# Patient Record
Sex: FEMALE | Race: WHITE | NOT HISPANIC OR LATINO | Employment: OTHER | URBAN - METROPOLITAN AREA
[De-identification: names, ages, dates, MRNs, and addresses within clinical notes are randomized per-mention and may not be internally consistent; named-entity substitution may affect disease eponyms.]

---

## 2017-01-17 DIAGNOSIS — F51.01 PRIMARY INSOMNIA: Primary | ICD-10-CM

## 2017-01-17 RX ORDER — ZOLPIDEM TARTRATE 5 MG/1
5 TABLET ORAL
Qty: 30 TABLET | Refills: 1 | Status: SHIPPED | OUTPATIENT
Start: 2017-01-17 | End: 2017-04-10

## 2017-03-27 ENCOUNTER — RADIANT APPOINTMENT (OUTPATIENT)
Dept: MAMMOGRAPHY | Facility: CLINIC | Age: 52
End: 2017-03-27
Attending: OBSTETRICS & GYNECOLOGY
Payer: COMMERCIAL

## 2017-03-27 ENCOUNTER — OFFICE VISIT (OUTPATIENT)
Dept: OBGYN | Facility: CLINIC | Age: 52
End: 2017-03-27
Attending: OBSTETRICS & GYNECOLOGY
Payer: COMMERCIAL

## 2017-03-27 VITALS
WEIGHT: 130 LBS | HEIGHT: 64 IN | BODY MASS INDEX: 22.2 KG/M2 | SYSTOLIC BLOOD PRESSURE: 108 MMHG | DIASTOLIC BLOOD PRESSURE: 66 MMHG

## 2017-03-27 DIAGNOSIS — Z12.31 VISIT FOR SCREENING MAMMOGRAM: ICD-10-CM

## 2017-03-27 DIAGNOSIS — Z87.42 HISTORY OF ABNORMAL CERVICAL PAP SMEAR: Primary | ICD-10-CM

## 2017-03-27 PROCEDURE — 88175 CYTOPATH C/V AUTO FLUID REDO: CPT | Performed by: OBSTETRICS & GYNECOLOGY

## 2017-03-27 PROCEDURE — 99212 OFFICE O/P EST SF 10 MIN: CPT | Performed by: OBSTETRICS & GYNECOLOGY

## 2017-03-27 PROCEDURE — G0202 SCR MAMMO BI INCL CAD: HCPCS | Mod: TC

## 2017-03-27 PROCEDURE — 88141 CYTOPATH C/V INTERPRET: CPT | Performed by: OBSTETRICS & GYNECOLOGY

## 2017-03-27 PROCEDURE — 87624 HPV HI-RISK TYP POOLED RSLT: CPT | Performed by: OBSTETRICS & GYNECOLOGY

## 2017-03-27 NOTE — MR AVS SNAPSHOT
"              After Visit Summary   3/27/2017    Katherine Tipton    MRN: [id number]           Patient Information     Date Of Birth          1965        Visit Information        Provider Department      3/27/2017 3:45 PM Cayetano Sal MD [facility]        Today's Diagnoses     History of abnormal cervical Pap smear    -  1       Follow-ups after your visit        Who to contact     If you have questions or need follow up information about today's clinic visit or your schedule please contact [facility] directly at [phone].  Normal or non-critical lab and imaging results will be communicated to you by [employer]art, letter or phone within 4 business days after the clinic has received the results. If you do not hear from us within 7 days, please contact the clinic through Aquirist or phone. If you have a critical or abnormal lab result, we will notify you by phone as soon as possible.  Submit refill requests through Niko Niko or call your pharmacy and they will forward the refill request to us. Please allow 3 business days for your refill to be completed.          Additional Information About Your Visit        MyChart Information     Niko Niko gives you secure access to your electronic health record. If you see a primary care provider, you can also send messages to your care team and make appointments. If you have questions, please call your primary care clinic.  If you do not have a primary care provider, please call [phone] and they will assist you.        Care EveryWhere ID     This is your Care EveryWhere ID. This could be used by other organizations to access your [facility] medical records  [id number]        Your Vitals Were     Height BMI (Body Mass Index)                5' 4\" (1.626 m) 22.31 kg/m2           Blood Pressure from Last 3 Encounters:   03/27/17 108/66   11/30/16 118/62   11/10/16 120/78    Weight from Last 3 Encounters:   03/27/17 130 lb (59 kg) "   11/30/16 129 lb (58.5 kg)   11/10/16 130 lb 9.6 oz (59.2 kg)              We Performed the Following     HPV High Risk Types DNA Cervical     Pap imaged thin layer screen with HPV - recommended age 30 - 65          Today's Medication Changes          These changes are accurate as of: 3/27/17  4:09 PM.  If you have any questions, ask your nurse or doctor.               Stop taking these medicines if you haven't already. Please contact your care team if you have questions.     amoxicillin-clavulanate 1000-62.5 MG per 12 hr tablet   Commonly known as:  AUGMENTIN XR   Stopped by:  Cayetano Sal MD                    Primary Care Provider Office Phone # Fax #    Dani Hwang [phone]       [address]  [place] [facility] [address]        Thank you!     Thank you for choosing [facility] [place]  for your care. Our goal is always to provide you with excellent care. Hearing back from our patients is one way we can continue to improve our services. Please take a few minutes to complete the written survey that you may receive in the mail after your visit with us. Thank you!             Your Updated Medication List - Protect others around you: Learn how to safely use, store and throw away your medicines at www.disposemymeds.org.          This list is accurate as of: 3/27/17  4:09 PM.  Always use your most recent med list.                   Brand Name Dispense Instructions for use    aspirin 81 MG tablet     90 tablet    Take 1 tablet by mouth daily.       estradiol 1 MG tablet    ESTRACE    90 tablet    Take 1 tablet (1 mg) by mouth daily       omeprazole 20 MG CR capsule    priLOSEC    30 capsule    Take 1 capsule by mouth daily.       simvastatin 40 MG tablet    ZOCOR    90 tablet    Take 1 tablet (40 mg) by mouth At Bedtime       VITAMIN B12 PO          VITAMIN D3 PO      Take by mouth daily       zolpidem 5 MG tablet    AMBIEN    30 tablet    Take 1 tablet (5 mg)  by mouth nightly as needed for sleep

## 2017-03-27 NOTE — PROGRESS NOTES
SUBJECTIVE:                                                   Katherine Tipton is a 51 year old female who presents to clinic today for the following health issue(s):  Patient presents with:  Repeat Pap Smear: LSIL and positive HR HPV on 16.      Additional information:     HPI: Patient is seen at this time for follow-up abnormal Pap smears. She also needs her mammogram today.Constitutional:  Appearance: Well nourished, well developed alert, in no acute distress  Pelvic Exam:No LMP recorded. Patient has had a hysterectomy..   Patient is sexually active, .  Using hysterectomy for contraception.    reports that she has quit smoking. Her smoking use included Cigarettes. She has a 1.50 pack-year smoking history. She has never used smokeless tobacco.    STD testing offered?  Declined    Health maintenance updated:  yes    Today's PHQ-2 Score:   PHQ-2 (  [employer]) 2015   Q1: Little interest or pleasure in doing things 0   Q2: Feeling down, depressed or hopeless 0   PHQ-2 Score 0     Today's PHQ-9 Score:   PHQ-9 SCORE 11/10/2016   Total Score 5     Today's PHILOMENA-7 Score:   PHILOMENA-7 SCORE 11/10/2016   Total Score 0       Problem list and histories reviewed & adjusted, as indicated.  Additional history: as documented.    Patient Active Problem List   Diagnosis     CARDIOVASCULAR SCREENING; LDL GOAL LESS THAN 130     Migraine headache     GERD (gastroesophageal reflux disease)     Phlebitis     MEDIAL MENISCUS TEAR - left     Colitis     Familial hyperlipoproteinemia     Paresthesias, hands & feet     Shoulder impingement - bilateral     Cervical radiculopathy     Vertiginous migraine, acephalgic migraine episodes also     Past Surgical History:   Procedure Laterality Date     AS ABLATION, ENDOMETRIAL, THERMAL, W/O HYSTEROSCOPIC GUIDANCE      Her Option Endo Ablation     CL AFF SURGICAL PATHOLOGY       COLONOSCOPY WITH CO2 INSUFFLATION N/A 2016    Procedure: COLONOSCOPY WITH CO2 INSUFFLATION;   Surgeon: Ana Romo MD;  Location: [facility]     COMBINED ESOPHAGOSCOPY, GASTROSCOPY, DUODENOSCOPY (EGD) WITH CO2 INSUFFLATION N/A 7/26/2016    Procedure: COMBINED ESOPHAGOSCOPY, GASTROSCOPY, DUODENOSCOPY (EGD) WITH CO2 INSUFFLATION;  Surgeon: Ana Romo MD;  Location: [facility] OR     ENDOSCOPIC LIGATION VEIN(S)  1995    Right     ESOPHAGOSCOPY, GASTROSCOPY, DUODENOSCOPY (EGD), COMBINED N/A 7/26/2016    Procedure: COMBINED ESOPHAGOSCOPY, GASTROSCOPY, DUODENOSCOPY (EGD), BIOPSY SINGLE OR MULTIPLE;  Surgeon: Ana Romo MD;  Location: [facility] OR     HC DILATION/CURETTAGE DIAG/THER NON OB  2002     HC KNEE SCOPE,SINGLE MENISECTOMY  7/15/11    Left - LATERAL AND MEDIAL MENISECTOMIES - NOT SINGLE!!!     HC REMOVAL OF TONSILS,12+ Y/O  30 years ago     HYSTERECTOMY, SUPRACERVICAL VAGINAL  2006     NECK SURGERY  2014    cervical fusion     TUBAL LIGATION  1993      Social History   Substance Use Topics     Smoking status: Former Smoker     Packs/day: 0.50     Years: 3.00     Types: Cigarettes     Smokeless tobacco: Never Used     Alcohol use 1.2 - 1.8 oz/week     2 - 3 Standard drinks or equivalent per week      Comment: 2-4 drinks/week      Problem (# of Occurrences) Relation (Name,Age of Onset)    Alcohol/Drug (1) Maternal Grandmother    Allergies (1) Daughter    Arthritis (2) Mother, Father    Asthma (2) Son (Santana), Daughter    CANCER (1) Maternal Grandmother: Bone cancer    CEREBROVASCULAR DISEASE (2) Father, Paternal Grandfather    GASTROINTESTINAL DISEASE (1) Mother: Kidney    HEART DISEASE (2) Paternal Grandmother, Son (Santana)    Hypertension (1) Mother    Lipids (1) Father    Neurologic Disorder (3) Paternal Grandmother: migraines, Daughter: migraines, Son (Skyler): migraines       Negative family history of: Blood Disease, Anesthesia Reaction            Current Outpatient Prescriptions   Medication Sig     zolpidem (AMBIEN) 5 MG tablet Take 1 tablet (5 mg) by mouth nightly  "as needed for sleep     Cyanocobalamin (VITAMIN B12 PO)      Cholecalciferol (VITAMIN D3 PO) Take by mouth daily     estradiol (ESTRACE) 1 MG tablet Take 1 tablet (1 mg) by mouth daily     simvastatin (ZOCOR) 40 MG tablet Take 1 tablet (40 mg) by mouth At Bedtime     omeprazole (PRILOSEC) 20 MG capsule Take 1 capsule by mouth daily.     aspirin 81 MG tablet Take 1 tablet by mouth daily.     No current facility-administered medications for this visit.      Allergies   Allergen Reactions     Verapamil Unknown       ROS:  12 point review of systems negative other than symptoms noted below.    OBJECTIVE:     /66  Ht 5' 4\" (1.626 m)  Wt 130 lb (59 kg)  BMI 22.31 kg/m2  Body mass index is 22.31 kg/(m^2).    Exam:  Constitutional:  Appearance: Well nourished, well developed alert, in no acute distress  Pelvic Exam:  External Genitalia:     Normal appearance for age, no discharge present, no tenderness present, no inflammatory lesions present, color normal  Vagina:     Normal vaginal vault without central or paravaginal defects, no discharge present, no inflammatory lesions present, no masses present  Bladder:     Nontender to palpation  Urethra:   Urethral Body:  Urethra palpation normal, urethra structural support normal   Urethral Meatus:  No erythema or lesions present  Cervix:     Surgically absent  Uterus:     Surgically absent  Adnexa:     Surgically absent  Perineum:     Perineum within normal limits, no evidence of trauma, no rashes or skin lesions present  Anus:     Anus within normal limits, no hemorrhoids present  Inguinal Lymph Nodes:     No lymphadenopathy present     In-Clinic Test Results:      ASSESSMENT/PLAN:                                                        We will contact patient with her Pap smear results. She also is completing her mammogram today.          Cayetano Sal MD  [facility] [place]  "

## 2017-03-31 LAB
COPATH REPORT: ABNORMAL
PAP: ABNORMAL

## 2017-04-03 ENCOUNTER — TELEPHONE (OUTPATIENT)
Dept: NURSING | Facility: CLINIC | Age: 52
End: 2017-04-03

## 2017-04-03 LAB
FINAL DIAGNOSIS: ABNORMAL
HPV HR 12 DNA CVX QL NAA+PROBE: POSITIVE
HPV16 DNA SPEC QL NAA+PROBE: NEGATIVE
HPV18 DNA SPEC QL NAA+PROBE: NEGATIVE
SPECIMEN DESCRIPTION: ABNORMAL

## 2017-04-03 NOTE — TELEPHONE ENCOUNTER
Pt is requesting PAP results today if able, no result note available. Note routed to Maria Esther Canada to review and advise requesting a callback today.

## 2017-04-04 DIAGNOSIS — B37.9 YEAST INFECTION: Primary | ICD-10-CM

## 2017-04-04 RX ORDER — FLUCONAZOLE 150 MG/1
150 TABLET ORAL
Qty: 2 TABLET | Refills: 0 | Status: SHIPPED | OUTPATIENT
Start: 2017-04-04 | End: 2017-12-28

## 2017-04-10 DIAGNOSIS — F51.01 PRIMARY INSOMNIA: ICD-10-CM

## 2017-04-10 RX ORDER — ZOLPIDEM TARTRATE 5 MG/1
5 TABLET ORAL
Qty: 30 TABLET | Refills: 1 | Status: SHIPPED | OUTPATIENT
Start: 2017-04-10 | End: 2017-07-13

## 2017-04-10 NOTE — TELEPHONE ENCOUNTER
zolpidem (AMBIEN) 5 MG tablet     Last Written Prescription Date:  1/17/17  Last Fill Quantity: 30,   # refills: 1  Last Office Visit with [facility] primary care provider:  11/10/16  Future Office visit: none    Routing refill request to provider for review/approval because:  Drug not on the [facility], [facility] or [facility] refill protocol or controlled substance. Routing to Maria Esther Canada

## 2017-04-13 NOTE — TELEPHONE ENCOUNTER
Pt calling stating the pharmacy had not received Rx.  Called pharmacy, informed it was faxed twice, gave verbal read back correct.

## 2017-07-13 DIAGNOSIS — F51.01 PRIMARY INSOMNIA: ICD-10-CM

## 2017-07-13 RX ORDER — ZOLPIDEM TARTRATE 5 MG/1
5 TABLET ORAL
Qty: 30 TABLET | Refills: 1 | Status: SHIPPED | OUTPATIENT
Start: 2017-07-13 | End: 2017-09-19

## 2017-07-13 NOTE — TELEPHONE ENCOUNTER
Zolpidem Tartrate 5 mg tablet    Last Written Prescription Date:  4/10/17  Last Fill Quantity: 30,   # refills: 1  Last Office Visit with [facility] primary care provider:  Annual 11/10/16  Future Office visit: none    Routing refill request to provider for review/approval because:  Drug not on the [facility], [facility] or [facility] refill protocol or controlled substance. Routing to Lauren Bro. Pharmacy pended

## 2017-09-13 ENCOUNTER — RESULT FOLLOW UP (OUTPATIENT)
Dept: OBGYN | Facility: CLINIC | Age: 52
End: 2017-09-13

## 2017-09-13 ENCOUNTER — OFFICE VISIT (OUTPATIENT)
Dept: OBGYN | Facility: CLINIC | Age: 52
End: 2017-09-13
Payer: COMMERCIAL

## 2017-09-13 VITALS
HEIGHT: 64 IN | SYSTOLIC BLOOD PRESSURE: 108 MMHG | BODY MASS INDEX: 22.36 KG/M2 | WEIGHT: 131 LBS | DIASTOLIC BLOOD PRESSURE: 68 MMHG

## 2017-09-13 DIAGNOSIS — R87.810 ASCUS WITH POSITIVE HIGH RISK HPV CERVICAL: Primary | ICD-10-CM

## 2017-09-13 DIAGNOSIS — R87.610 ASCUS WITH POSITIVE HIGH RISK HPV CERVICAL: Primary | ICD-10-CM

## 2017-09-13 DIAGNOSIS — Z90.710 HISTORY OF HYSTERECTOMY INCLUDING CERVIX: ICD-10-CM

## 2017-09-13 PROCEDURE — 87624 HPV HI-RISK TYP POOLED RSLT: CPT | Performed by: NURSE PRACTITIONER

## 2017-09-13 PROCEDURE — 88141 CYTOPATH C/V INTERPRET: CPT | Performed by: NURSE PRACTITIONER

## 2017-09-13 PROCEDURE — 88175 CYTOPATH C/V AUTO FLUID REDO: CPT | Performed by: NURSE PRACTITIONER

## 2017-09-13 PROCEDURE — 99212 OFFICE O/P EST SF 10 MIN: CPT | Performed by: NURSE PRACTITIONER

## 2017-09-13 NOTE — PROGRESS NOTES
SUBJECTIVE:                                                   Katherine Tipton is a 52 year old female who presents to clinic today for the following health issue(s):  Patient presents with:  Repeat Pap Smear: ASCUS HPV other (+)pos 3/27/17      HPI:  Pt here today for repeat pap. Her last pap at her annual exam in 2017 was ASCUS HPV other +. She is feeling well. No concerns.    No LMP recorded. Patient has had a hysterectomy..   Patient is sexually active, .  Using hysterectomy for contraception.    reports that she has quit smoking. Her smoking use included Cigarettes. She has a 1.50 pack-year smoking history. She has never used smokeless tobacco.      STD testing offered?  Declined    Health maintenance updated:  yes    Today's PHQ-2 Score:   PHQ-2 (  [employer]) 2015   Q1: Little interest or pleasure in doing things 0   Q2: Feeling down, depressed or hopeless 0   PHQ-2 Score 0     Today's PHQ-9 Score:   PHQ-9 SCORE 11/10/2016   Total Score 5     Today's PHILOMENA-7 Score:   PHILOMENA-7 SCORE 11/10/2016   Total Score 0       Problem list and histories reviewed & adjusted, as indicated.  Additional history: as documented.    Patient Active Problem List   Diagnosis     CARDIOVASCULAR SCREENING; LDL GOAL LESS THAN 130     Migraine headache     GERD (gastroesophageal reflux disease)     Phlebitis     MEDIAL MENISCUS TEAR - left     Colitis     Familial hyperlipoproteinemia     Paresthesias, hands & feet     Shoulder impingement - bilateral     Cervical radiculopathy     Vertiginous migraine, acephalgic migraine episodes also     Past Surgical History:   Procedure Laterality Date     AS ABLATION, ENDOMETRIAL, THERMAL, W/O HYSTEROSCOPIC GUIDANCE      Her Option Endo Ablation     CL AFF SURGICAL PATHOLOGY       COLONOSCOPY WITH CO2 INSUFFLATION N/A 2016    Procedure: COLONOSCOPY WITH CO2 INSUFFLATION;  Surgeon: Ana Romo MD;  Location: [facility]     COMBINED ESOPHAGOSCOPY,  GASTROSCOPY, DUODENOSCOPY (EGD) WITH CO2 INSUFFLATION N/A 7/26/2016    Procedure: COMBINED ESOPHAGOSCOPY, GASTROSCOPY, DUODENOSCOPY (EGD) WITH CO2 INSUFFLATION;  Surgeon: Ana Romo MD;  Location: [facility]     ENDOSCOPIC LIGATION VEIN(S)  1995    Right     ESOPHAGOSCOPY, GASTROSCOPY, DUODENOSCOPY (EGD), COMBINED N/A 7/26/2016    Procedure: COMBINED ESOPHAGOSCOPY, GASTROSCOPY, DUODENOSCOPY (EGD), BIOPSY SINGLE OR MULTIPLE;  Surgeon: Ana Romo MD;  Location: [facility]     HC DILATION/CURETTAGE DIAG/THER NON OB  2002     HC KNEE SCOPE,SINGLE MENISECTOMY  7/15/11    Left - LATERAL AND MEDIAL MENISECTOMIES - NOT SINGLE!!!     HC REMOVAL OF TONSILS,12+ Y/O  30 years ago     HYSTERECTOMY, SUPRACERVICAL VAGINAL  2006     NECK SURGERY  2014    cervical fusion     TUBAL LIGATION  1993      Social History   Substance Use Topics     Smoking status: Former Smoker     Packs/day: 0.50     Years: 3.00     Types: Cigarettes     Smokeless tobacco: Never Used     Alcohol use 1.2 - 1.8 oz/week     2 - 3 Standard drinks or equivalent per week      Comment: 2-4 drinks/week      Problem (# of Occurrences) Relation (Name,Age of Onset)    Alcohol/Drug (1) Maternal Grandmother    Allergies (1) Daughter    Arthritis (2) Mother, Father    Asthma (2) Son (Santana), Daughter    CANCER (1) Maternal Grandmother: Bone cancer    CEREBROVASCULAR DISEASE (2) Father, Paternal Grandfather    GASTROINTESTINAL DISEASE (1) Mother: Kidney    HEART DISEASE (2) Paternal Grandmother, Son (Santana)    Hypertension (1) Mother    Lipids (1) Father    Neurologic Disorder (3) Paternal Grandmother: migraines, Daughter: migraines, Son (Skyler): migraines       Negative family history of: Blood Disease, Anesthesia Reaction            Current Outpatient Prescriptions   Medication Sig     zolpidem (AMBIEN) 5 MG tablet Take 1 tablet (5 mg) by mouth nightly as needed for sleep     fluconazole (DIFLUCAN) 150 MG tablet Take 1 tablet (150 mg) by  "mouth every 3 days     Cyanocobalamin (VITAMIN B12 PO)      Cholecalciferol (VITAMIN D3 PO) Take by mouth daily     estradiol (ESTRACE) 1 MG tablet Take 1 tablet (1 mg) by mouth daily     simvastatin (ZOCOR) 40 MG tablet Take 1 tablet (40 mg) by mouth At Bedtime     omeprazole (PRILOSEC) 20 MG capsule Take 1 capsule by mouth daily.     aspirin 81 MG tablet Take 1 tablet by mouth daily.     No current facility-administered medications for this visit.      Allergies   Allergen Reactions     Verapamil Unknown       ROS:  12 point review of systems negative other than symptoms noted below.    OBJECTIVE:     /68  Ht 5' 4\" (1.626 m)  Wt 131 lb (59.4 kg)  Breastfeeding? No  BMI 22.49 kg/m2  Body mass index is 22.49 kg/(m^2).    Exam:  Constitutional:  Appearance: Well nourished, well developed alert, in no acute distress  Neurologic/Psychiatric:  Mental Status:  Oriented X3   Pelvic Exam:  External Genitalia:     Normal appearance for age, no discharge present, no tenderness present, no inflammatory lesions present, color normal  Vagina:     Normal vaginal vault without central or paravaginal defects, no discharge present, no inflammatory lesions present, no masses present  Bladder:     Nontender to palpation  Urethra:   Urethral Body:  Urethra palpation normal, urethra structural support normal   Urethral Meatus:  No erythema or lesions present  Cervix:     Surgically absent  Uterus:     Surgically absent  Adnexa:     Surgically absent  Perineum:     Perineum within normal limits, no evidence of trauma, no rashes or skin lesions present  Anus:     Anus within normal limits, no hemorrhoids present  Inguinal Lymph Nodes:     No lymphadenopathy present     In-Clinic Test Results:  No results found for this or any previous visit (from the past 24 hour(s)).    ASSESSMENT/PLAN:                                                        ICD-10-CM    1. ASCUS with positive high risk HPV cervical R87.610 Pap imaged thin " layer diagnostic with HPV (select HPV order below)    R87.810 HPV High Risk Types DNA Cervical       There are no Patient Instructions on file for this visit.    Normal vaginal exam. Will update on pap results. If normal or unchanged, repap in 6 months at annual exam. If abnormal, will follow up as appropriate.    MIGUEL Macias [facility]

## 2017-09-13 NOTE — LETTER
Fanny 15, 2018      Katherine LALITA Danuta  [address]  [facility] [address]    Dear ,      At [facility], your health and wellness is our primary concern. That is why we are following up on an abnormal pap from 12/28/17, which was reported as ASCUS and positive for high risk HPV. Your provider had recommended that you have a Pap smear completed by 06/28/18. Our records do not show that this has been scheduled.    It is important to complete the follow up that your provider has suggested for you to ensure that there are no worsening changes which may, over time, develop into cancer.      Please contact our office at  [phone] to schedule an appointment for a Pap smear at your earliest convenience. If you have questions or concerns, please call the clinic and we will be happy to assist you.    If you have completed the tests outside of [facility], please have the results forwarded to our office. We will update the chart for your primary Physician to review before your next annual physical.     Thank you for choosing [facility]!    Sincerely,      Lauren Bro, APRN CNP/es

## 2017-09-13 NOTE — LETTER
December 14, 2018      Katherine Tipton  [address]  [place] [facility] [address]    Dear ,      This letter is to remind you that you are due for your follow up PAP smear and HPV test on or about 12/28/18.    Please call [phone] to schedule your appointment at your earliest convenience.     If you have completed the tests outside of [facility], please have the results forwarded to our office. We will update the chart for your primary Physician to review before your next annual physical.     Sincerely,      MIGUEL Macias CNP/esh

## 2017-09-13 NOTE — MR AVS SNAPSHOT
"              After Visit Summary   9/13/2017    Katherine Tipton    MRN: [id number]           Patient Information     Date Of Birth          1965        Visit Information        Provider Department      9/13/2017 1:00 PM Lauren Bro APRN CNP [facility]        Today's Diagnoses     ASCUS with positive high risk HPV cervical    -  1       Follow-ups after your visit        Follow-up notes from your care team     Return in about 6 months (around 3/13/2018).      Who to contact     If you have questions or need follow up information about today's clinic visit or your schedule please contact [facility] directly at [phone].  Normal or non-critical lab and imaging results will be communicated to you by Purpluhart, letter or phone within 4 business days after the clinic has received the results. If you do not hear from us within 7 days, please contact the clinic through Purpluhart or phone. If you have a critical or abnormal lab result, we will notify you by phone as soon as possible.  Submit refill requests through Red e App or call your pharmacy and they will forward the refill request to us. Please allow 3 business days for your refill to be completed.          Additional Information About Your Visit        MyChart Information     Red e App gives you secure access to your electronic health record. If you see a primary care provider, you can also send messages to your care team and make appointments. If you have questions, please call your primary care clinic.  If you do not have a primary care provider, please call [phone] and they will assist you.        Care EveryWhere ID     This is your Care EveryWhere ID. This could be used by other organizations to access your [facility] medical records  [id number]        Your Vitals Were     Height Breastfeeding? BMI (Body Mass Index)             5' 4\" (1.626 m) No 22.49 kg/m2          Blood Pressure from Last 3 Encounters: "   09/13/17 108/68   03/27/17 108/66   11/30/16 118/62    Weight from Last 3 Encounters:   09/13/17 131 lb (59.4 kg)   03/27/17 130 lb (59 kg)   11/30/16 129 lb (58.5 kg)              We Performed the Following     HPV High Risk Types DNA Cervical     Pap imaged thin layer diagnostic with HPV (select HPV order below)        Primary Care Provider Office Phone # Fax #    Dani Hwang [phone]       [address] [place]  [facility] [address]        Equal Access to Services     [facility]: Hadii joselito ku hadasho Soomaali, waaxda luqadaha, qaybta kaalmada jewel, alfredo duron . So [facility] [phone].    ATENCIÓN: Si habla español, tiene a lundberg disposición servicios gratuitos de asistencia lingüística. [facility] [phone].    We comply with applicable federal civil rights laws and [place] laws. We do not discriminate on the basis of race, color, national origin, age, disability sex, sexual orientation or gender identity.            Thank you!     Thank you for choosing [facility] [place]  for your care. Our goal is always to provide you with excellent care. Hearing back from our patients is one way we can continue to improve our services. Please take a few minutes to complete the written survey that you may receive in the mail after your visit with us. Thank you!             Your Updated Medication List - Protect others around you: Learn how to safely use, store and throw away your medicines at www.disposemymeds.org.          This list is accurate as of: 9/13/17  1:41 PM.  Always use your most recent med list.                   Brand Name Dispense Instructions for use Diagnosis    aspirin 81 MG tablet     90 tablet    Take 1 tablet by mouth daily.    Vertiginous migraine       estradiol 1 MG tablet    ESTRACE    90 tablet    Take 1 tablet (1 mg) by mouth daily    Symptomatic menopausal or female climacteric states       fluconazole 150  MG tablet    DIFLUCAN    2 tablet    Take 1 tablet (150 mg) by mouth every 3 days    Yeast infection       omeprazole 20 MG CR capsule    priLOSEC    30 capsule    Take 1 capsule by mouth daily.    GERD (gastroesophageal reflux disease)       simvastatin 40 MG tablet    ZOCOR    90 tablet    Take 1 tablet (40 mg) by mouth At Bedtime    Hyperlipidemia LDL goal <100       VITAMIN B12 PO           VITAMIN D3 PO      Take by mouth daily        zolpidem 5 MG tablet    AMBIEN    30 tablet    Take 1 tablet (5 mg) by mouth nightly as needed for sleep    Primary insomnia

## 2017-09-13 NOTE — LETTER
October 14, 2019      Katherine Tipton  [address]  [facility] [address]    Dear ,      At [facility], your health and wellness is our primary concern. That is why we are following up on a colposcopy from 04/29/19. Your provider had recommended that you have a Pap smear and HPV test completed by 10/29/19. Our records do not show that this has been scheduled.    It is important to complete the follow up that your provider has suggested for you to ensure that there are no worsening changes which may, over time, develop into cancer.      Please contact our office at  [phone] to schedule an appointment for a Pap smear and HPV test at your earliest convenience. If you have questions or concerns, please call the clinic and we will be happy to assist you.    If you have completed the tests outside of [facility], please have the results forwarded to our office. We will update the chart for your primary Physician to review before your next annual physical.     Thank you for choosing [facility]!    Sincerely,      Your [facility] Care Team//[facility]

## 2017-09-18 LAB
COPATH REPORT: ABNORMAL
PAP: ABNORMAL

## 2017-09-19 DIAGNOSIS — F51.01 PRIMARY INSOMNIA: ICD-10-CM

## 2017-09-19 RX ORDER — ZOLPIDEM TARTRATE 5 MG/1
5 TABLET ORAL
Qty: 30 TABLET | Refills: 1 | Status: SHIPPED | OUTPATIENT
Start: 2017-09-19 | End: 2017-11-21

## 2017-09-19 NOTE — TELEPHONE ENCOUNTER
Pending Prescriptions:                       Disp   Refills    zolpidem (AMBIEN) 5 MG tablet             30 tab*1            Sig: Take 1 tablet (5 mg) by mouth nightly as needed           for sleep          Last Written Prescription Date:  7/13/17  Last Fill Quantity: 30,   # refills: 1  Last Office Visit with [facility], [facility] or [facility] prescribing provider: 11/10/16 (yearly) 9/13/17 (rpt pap)  Future Office visit:   none

## 2017-09-19 NOTE — TELEPHONE ENCOUNTER
Routing refill request to provider for review/approval because:  Drug not on the [facility], [facility] or [facility] refill protocol or controlled substance. Note routed to Lauren narayan for refill?

## 2017-10-09 ENCOUNTER — OFFICE VISIT (OUTPATIENT)
Dept: OBGYN | Facility: CLINIC | Age: 52
End: 2017-10-09
Payer: COMMERCIAL

## 2017-10-09 VITALS
DIASTOLIC BLOOD PRESSURE: 76 MMHG | HEIGHT: 64 IN | BODY MASS INDEX: 22.36 KG/M2 | SYSTOLIC BLOOD PRESSURE: 118 MMHG | WEIGHT: 131 LBS

## 2017-10-09 DIAGNOSIS — N89.3 DYSPLASIA OF VAGINA: Primary | ICD-10-CM

## 2017-10-09 PROCEDURE — 57420 EXAM OF VAGINA W/SCOPE: CPT | Performed by: OBSTETRICS & GYNECOLOGY

## 2017-10-09 PROCEDURE — 87624 HPV HI-RISK TYP POOLED RSLT: CPT | Performed by: OBSTETRICS & GYNECOLOGY

## 2017-10-09 PROCEDURE — 88141 CYTOPATH C/V INTERPRET: CPT | Performed by: OBSTETRICS & GYNECOLOGY

## 2017-10-09 PROCEDURE — 88175 CYTOPATH C/V AUTO FLUID REDO: CPT | Performed by: OBSTETRICS & GYNECOLOGY

## 2017-10-09 NOTE — MR AVS SNAPSHOT
"              After Visit Summary   10/9/2017    Katherine Tipton    MRN: [id number]           Patient Information     Date Of Birth          1965        Visit Information        Provider Department      10/9/2017 2:15 PM Cayetano Sal MD [facility]        Today's Diagnoses     Dysplasia of vagina    -  1       Follow-ups after your visit        Who to contact     If you have questions or need follow up information about today's clinic visit or your schedule please contact [facility] directly at [phone].  Normal or non-critical lab and imaging results will be communicated to you by Verac[employer]hart, letter or phone within 4 business days after the clinic has received the results. If you do not hear from us within 7 days, please contact the clinic through [employer] or phone. If you have a critical or abnormal lab result, we will notify you by phone as soon as possible.  Submit refill requests through [employer] or call your pharmacy and they will forward the refill request to us. Please allow 3 business days for your refill to be completed.          Additional Information About Your Visit        MyChart Information     [employer] gives you secure access to your electronic health record. If you see a primary care provider, you can also send messages to your care team and make appointments. If you have questions, please call your primary care clinic.  If you do not have a primary care provider, please call [phone] and they will assist you.        Care EveryWhere ID     This is your Care EveryWhere ID. This could be used by other organizations to access your [place] medical records  [id number]        Your Vitals Were     Height Breastfeeding? BMI (Body Mass Index)             5' 4\" (1.626 m) No 22.49 kg/m2          Blood Pressure from Last 3 Encounters:   10/09/17 118/76   09/13/17 108/68   03/27/17 108/66    Weight from Last 3 Encounters:   10/09/17 131 lb (59.4 kg) "   09/13/17 131 lb (59.4 kg)   03/27/17 130 lb (59 kg)              We Performed the Following     COLPOSCOPY CERVIX/UPPER VAGINA W BX CERVIX     HPV High Risk Types DNA Cervical     Pap imaged thin layer diagnostic with HPV (select HPV order below)        Primary Care Provider Office Phone # Fax #    Dani Hwang [phone]       [address] [place]  [facility] [address]        Equal Access to Services     JOHANNA MONTEIRO : Hadii aad ku hadasho Soomaali, waaxda luqadaha, qaybta kaalmada adeegyada, waxay idiin hayaan adeeg kharacampos lamehdi . So [facility] [phone].    ATENCIÓN: Si violeta burr, tiene a lundberg disposición servicios gratuitos de asistencia lingüística. [facility] [phone].    We comply with applicable federal civil rights laws and [place] laws. We do not discriminate on the basis of race, color, national origin, age, disability, sex, sexual orientation, or gender identity.            Thank you!     Thank you for choosing [facility]  for your care. Our goal is always to provide you with excellent care. Hearing back from our patients is one way we can continue to improve our services. Please take a few minutes to complete the written survey that you may receive in the mail after your visit with us. Thank you!             Your Updated Medication List - Protect others around you: Learn how to safely use, store and throw away your medicines at www.disposemymeds.org.          This list is accurate as of: 10/9/17 11:59 PM.  Always use your most recent med list.                   Brand Name Dispense Instructions for use Diagnosis    aspirin 81 MG tablet     90 tablet    Take 1 tablet by mouth daily.    Vertiginous migraine       estradiol 1 MG tablet    ESTRACE    90 tablet    Take 1 tablet (1 mg) by mouth daily    Symptomatic menopausal or female climacteric states       FLEXERIL PO           fluconazole 150 MG tablet    DIFLUCAN    2 tablet    Take 1  tablet (150 mg) by mouth every 3 days    Yeast infection       LYRICA PO           omeprazole 20 MG CR capsule    priLOSEC    30 capsule    Take 1 capsule by mouth daily.    GERD (gastroesophageal reflux disease)       simvastatin 40 MG tablet    ZOCOR    90 tablet    Take 1 tablet (40 mg) by mouth At Bedtime    Hyperlipidemia LDL goal <100       VITAMIN B12 PO           VITAMIN D3 PO      Take by mouth daily        zolpidem 5 MG tablet    AMBIEN    30 tablet    Take 1 tablet (5 mg) by mouth nightly as needed for sleep    Primary insomnia

## 2017-10-09 NOTE — PROGRESS NOTES
INDICATIONS:                                                    Is a pregnancy test required: No.  Was a consent obtained?  Yes      Katherine Tipton, is a 52 year old female, who had a recent LGSIL pap.  HPV positive.  Yes prior history of abnormal pap. Here today for colposcopy. Discussed indication, risks of infection and bleeding.    Her last pap was   Lab Results   Component Value Date    PAP LSIL 09/13/2017    .    PROCEDURE:                                                      Vagina is stained with acetic acid and viewed colposcopically.visualized in it's entirity. No vaginal lesions see     POST PROCEDURE:                                                      IMPRESSION: vain w/o obvious lesion    PLAN : Await the results of the biopsies.  Repeat pap in 6 months.  She  tolerated the procedure well. There were no complications. Patient was discharged in stable condition.    Patient advised to call the clinic if excessive bleeding, pelvic pain, or fever.     Follow-up plan based on pathology results.    Cayetano Sal MD

## 2017-10-09 NOTE — LETTER
October 17, 2017      Katherine Tipton  [address]  [facility] [address]    Dear Ms.Danuta,    We have recently received your PAP smear results that were completed during your colposcopy on 10/9/17.  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 This is the same result you had prior to the procedure.     Please return to clinic in 6 months for a repeat pap smear.     If you have additional questions regarding this result, please call the pap nurse at [phone].    Sincerely,      Cayetano Sal MD/OLAMIDE RN

## 2017-10-13 LAB
COPATH REPORT: ABNORMAL
PAP: ABNORMAL

## 2017-11-13 ENCOUNTER — TELEPHONE (OUTPATIENT)
Dept: NURSING | Facility: CLINIC | Age: 52
End: 2017-11-13

## 2017-11-13 DIAGNOSIS — N95.1 SYMPTOMATIC MENOPAUSAL OR FEMALE CLIMACTERIC STATES: ICD-10-CM

## 2017-11-13 RX ORDER — ESTRADIOL 1 MG/1
1 TABLET ORAL DAILY
Qty: 30 TABLET | Refills: 0 | Status: SHIPPED | OUTPATIENT
Start: 2017-11-13 | End: 2017-12-28

## 2017-11-21 ENCOUNTER — TELEPHONE (OUTPATIENT)
Dept: NURSING | Facility: CLINIC | Age: 52
End: 2017-11-21

## 2017-11-21 DIAGNOSIS — F51.01 PRIMARY INSOMNIA: ICD-10-CM

## 2017-11-21 RX ORDER — ZOLPIDEM TARTRATE 5 MG/1
5 TABLET ORAL
Qty: 30 TABLET | Refills: 0 | Status: SHIPPED | OUTPATIENT
Start: 2017-11-21 | End: 2017-12-28

## 2017-11-21 NOTE — TELEPHONE ENCOUNTER
Zolpidem (Ambien) 5 mg tablet       Last Written Prescription Date:  9/19/17  Last Fill Quantity: 30 tabs,   # refills: 1  Last Office Visit with [facility] primary care provider:  10/9/17 Colposcopy, 9/13/17 Repeat PAP smear, Repeat PAP smear, 11/30/16 Colposcopy, 11/10/16 Annual with SHALINI Aguilar  Future Office visit: None    Pt calling for refill of Ambien 5 mg and that her pharmacy has been trying to fill but has not gotten an authorization from the clinic. Informed we did not receive a refill request for Ambien. Pt states she takes 1 tablet a night of Ambien 5 mg. Per providers notes (copy and pasted below) from last annual pt noted taking the Rx differently. Pt aware that is due for annual exam and to be seen within the next month or so for it. Pt verbalized understanding. Routing to SHALINI Aguilar to review/advise.    Note from 11/10/16 annual exam by SHALINI Aguilar: She takes 1.5 tabs of ambien about once or twice per week which helps.

## 2017-12-28 ENCOUNTER — OFFICE VISIT (OUTPATIENT)
Dept: OBGYN | Facility: CLINIC | Age: 52
End: 2017-12-28
Payer: COMMERCIAL

## 2017-12-28 VITALS
BODY MASS INDEX: 22.53 KG/M2 | DIASTOLIC BLOOD PRESSURE: 78 MMHG | HEIGHT: 64 IN | WEIGHT: 132 LBS | SYSTOLIC BLOOD PRESSURE: 112 MMHG | HEART RATE: 64 BPM

## 2017-12-28 DIAGNOSIS — N95.1 SYMPTOMATIC MENOPAUSAL OR FEMALE CLIMACTERIC STATES: ICD-10-CM

## 2017-12-28 DIAGNOSIS — Z01.419 ENCOUNTER FOR GYNECOLOGICAL EXAMINATION WITHOUT ABNORMAL FINDING: Primary | ICD-10-CM

## 2017-12-28 DIAGNOSIS — F51.01 PRIMARY INSOMNIA: ICD-10-CM

## 2017-12-28 PROCEDURE — 88175 CYTOPATH C/V AUTO FLUID REDO: CPT | Performed by: NURSE PRACTITIONER

## 2017-12-28 PROCEDURE — 88141 CYTOPATH C/V INTERPRET: CPT | Performed by: NURSE PRACTITIONER

## 2017-12-28 PROCEDURE — 99396 PREV VISIT EST AGE 40-64: CPT | Performed by: NURSE PRACTITIONER

## 2017-12-28 PROCEDURE — 87624 HPV HI-RISK TYP POOLED RSLT: CPT | Performed by: NURSE PRACTITIONER

## 2017-12-28 RX ORDER — TRAZODONE HYDROCHLORIDE 50 MG/1
50 TABLET, FILM COATED ORAL AT BEDTIME
Qty: 90 TABLET | Refills: 1 | Status: SHIPPED | OUTPATIENT
Start: 2017-12-28 | End: 2018-06-25

## 2017-12-28 RX ORDER — ESTRADIOL 1 MG/1
1 TABLET ORAL DAILY
Qty: 90 TABLET | Refills: 3 | Status: SHIPPED | OUTPATIENT
Start: 2017-12-28 | End: 2018-12-05

## 2017-12-28 RX ORDER — ZOLPIDEM TARTRATE 5 MG/1
5 TABLET ORAL
Qty: 30 TABLET | Refills: 0 | Status: SHIPPED | OUTPATIENT
Start: 2017-12-28 | End: 2017-12-28 | Stop reason: ALTCHOICE

## 2017-12-28 ASSESSMENT — ANXIETY QUESTIONNAIRES
3. WORRYING TOO MUCH ABOUT DIFFERENT THINGS: NOT AT ALL
2. NOT BEING ABLE TO STOP OR CONTROL WORRYING: NOT AT ALL
6. BECOMING EASILY ANNOYED OR IRRITABLE: NOT AT ALL
GAD7 TOTAL SCORE: 1
7. FEELING AFRAID AS IF SOMETHING AWFUL MIGHT HAPPEN: NOT AT ALL
5. BEING SO RESTLESS THAT IT IS HARD TO SIT STILL: NOT AT ALL
IF YOU CHECKED OFF ANY PROBLEMS ON THIS QUESTIONNAIRE, HOW DIFFICULT HAVE THESE PROBLEMS MADE IT FOR YOU TO DO YOUR WORK, TAKE CARE OF THINGS AT HOME, OR GET ALONG WITH OTHER PEOPLE: NOT DIFFICULT AT ALL
1. FEELING NERVOUS, ANXIOUS, OR ON EDGE: SEVERAL DAYS

## 2017-12-28 ASSESSMENT — PATIENT HEALTH QUESTIONNAIRE - PHQ9
5. POOR APPETITE OR OVEREATING: NOT AT ALL
SUM OF ALL RESPONSES TO PHQ QUESTIONS 1-9: 1

## 2017-12-28 NOTE — LETTER
January 5, 2018      Katherine B Danuta  [address]  [facility] [address]    Dear MsVictor Manuel,      This letter is in regards to your recent cervical cancer screening (PAP smear and HPV test).    Your Pap smear result was reported as ASCUS or Atypical Squamous Cells of Undetermined Significance This means that there were mildly abnormal cells found in the sample that we collected from your cervix. The vast majority of patients with this result do not have significant cervical abnormalities.     Your cervical sample was also tested for the presence of Human Papillomavirus (HPV). Your sample was positive for HPV. There are many types of HPV, but we test pap samples for the high risk types. HPV can be the cause of an abnormal Pap smear result. The high risk types of HPV can also be related to the potential development of cervical cancer if not monitored and/or treated appropriately.    Over time, your body can get rid of these abnormal cells, so it is recommended that you repeat your PAP smear in 6 months.    If you have questions about these results contact the clinic at [phone].    Please continue to be seen every year for an annual physical exam and other preventative tests.    Sincerely,      Lauren Bro, APRN CNP/esh

## 2017-12-28 NOTE — MR AVS SNAPSHOT
After Visit Summary   12/28/2017    Katherine Tipton    MRN: [id number]           Patient Information     Date Of Birth          1965        Visit Information        Provider Department      12/28/2017 11:00 AM Lauren Bro APRN CNP [facility]        Today's Diagnoses     Encounter for gynecological examination without abnormal finding    -  1    Symptomatic menopausal or female climacteric states        Primary insomnia           Follow-ups after your visit        Follow-up notes from your care team     Return in about 1 year (around 12/28/2018).      Who to contact     If you have questions or need follow up information about today's clinic visit or your schedule please contact [facility] directly at [phone].  Normal or non-critical lab and imaging results will be communicated to you by [employer], letter or phone within 4 business days after the clinic has received the results. If you do not hear from us within 7 days, please contact the clinic through [employer] or phone. If you have a critical or abnormal lab result, we will notify you by phone as soon as possible.  Submit refill requests through [employer] or call your pharmacy and they will forward the refill request to us. Please allow 3 business days for your refill to be completed.          Additional Information About Your Visit        MyChart Information     [employer] gives you secure access to your electronic health record. If you see a primary care provider, you can also send messages to your care team and make appointments. If you have questions, please call your primary care clinic.  If you do not have a primary care provider, please call [phone] and they will assist you.        Care EveryWhere ID     This is your Care EveryWhere ID. This could be used by other organizations to access your [place] medical records  [id number]        Your Vitals Were     Pulse Height BMI (Body Mass  "Index)             64 5' 4\" (1.626 m) 22.66 kg/m2          Blood Pressure from Last 3 Encounters:   12/28/17 112/78   10/09/17 118/76   09/13/17 108/68    Weight from Last 3 Encounters:   12/28/17 132 lb (59.9 kg)   10/09/17 131 lb (59.4 kg)   09/13/17 131 lb (59.4 kg)              We Performed the Following     HPV High Risk Types DNA Cervical     Pap imaged thin layer screen with HPV - recommended age 30 - 65 years (select HPV order below)          Today's Medication Changes          These changes are accurate as of: 12/28/17 12:04 PM.  If you have any questions, ask your nurse or doctor.               Start taking these medicines.        Dose/Directions    traZODone 50 MG tablet   Commonly known as:  DESYREL   Used for:  Primary insomnia        Dose:  50 mg   Take 1 tablet (50 mg) by mouth At Bedtime   Quantity:  90 tablet   Refills:  1         Stop taking these medicines if you haven't already. Please contact your care team if you have questions.     zolpidem 5 MG tablet   Commonly known as:  AMBIEN                Where to get your medicines      These medications were sent to [facility] [address] [facility] - [place], [place] - [address]  [address] [facility] [address]     Phone:  [phone]     estradiol 1 MG tablet    traZODone 50 MG tablet                Primary Care Provider Office Phone # Fax #    Dani ALBA Janeyobanibianka [phone]       [address]  [facility] [address]        Equal Access to Services     KIRA MONTEIRO : Hadii aad ku hadasho Soomaali, waaxda luqadaha, qaybta kaalmada adeegyada, waxay emelynin hayemerald rush. So [facility] [phone].    ATENCIÓN: Si habla español, tiene a lundberg disposición servicios gratuitos de asistencia lingüística. Llame al [phone].    We comply with applicable federal civil rights laws and [place] laws. We do not discriminate on the basis of race, color, national origin, age, disability, sex, sexual " orientation, or gender identity.            Thank you!     Thank you for choosing [facility]  for your care. Our goal is always to provide you with excellent care. Hearing back from our patients is one way we can continue to improve our services. Please take a few minutes to complete the written survey that you may receive in the mail after your visit with us. Thank you!             Your Updated Medication List - Protect others around you: Learn how to safely use, store and throw away your medicines at www.disposemymeds.org.          This list is accurate as of: 12/28/17 12:04 PM.  Always use your most recent med list.                   Brand Name Dispense Instructions for use Diagnosis    aspirin 81 MG tablet     90 tablet    Take 1 tablet by mouth daily.    Vertiginous migraine       estradiol 1 MG tablet    ESTRACE    90 tablet    Take 1 tablet (1 mg) by mouth daily    Symptomatic menopausal or female climacteric states       FLEXERIL PO           omeprazole 20 MG CR capsule    priLOSEC    30 capsule    Take 1 capsule by mouth daily.    GERD (gastroesophageal reflux disease)       simvastatin 40 MG tablet    ZOCOR    90 tablet    Take 1 tablet (40 mg) by mouth At Bedtime    Hyperlipidemia LDL goal <100       traZODone 50 MG tablet    DESYREL    90 tablet    Take 1 tablet (50 mg) by mouth At Bedtime    Primary insomnia       VITAMIN B12 PO           VITAMIN D3 PO      Take by mouth daily

## 2017-12-28 NOTE — PROGRESS NOTES
"  Katherine is a 52 year old  female who presents for annual exam.     Besides routine health maintenance, she has no other health concerns today .    HPI:  The patient's PCP is  JUAN DODGE.  Pt here today for her annual exam. She gets her mammograms in February.     She had her 2nd neck fusion in November of this year, she is wearing a neck brace today.    She was running out of her oral E2 tabs and was taking them every other day. She suffered from hot flashes, night sweats and worsening anxiety during the 2 weeks she was using them every other day. She needs a refill today.     She also continues to struggle with insomnia. She takes ambien every night. Some nights she does take 2 tablets because about once per week she feels the ambien \"ramps\" her up. Even with ambien on board, she sleeps 4 hours per night.       GYNECOLOGIC HISTORY:    No LMP recorded. Patient has had a hysterectomy.  Her current contraception method is: hysterectomy.  She  reports that she has quit smoking. Her smoking use included Cigarettes. She has a 1.50 pack-year smoking history. She has never used smokeless tobacco.      Patient is sexually active.  STD testing offered?  Declined  Last PHQ-9 score on record =   PHQ-9 SCORE 2017   Total Score 1     Last GAD7 score on record =   PHILOMENA-7 SCORE 2017   Total Score 1     Alcohol Score = 2    HEALTH MAINTENANCE:  Cholesterol: (  Cholesterol   Date Value Ref Range Status   2015 261 (H) <200 mg/dL Final     Comment:     LDL Cholesterol is the primary guide to therapy.   The NCEP recommends further evaluation of: patients with cholesterol greater   than 200 mg/dL if additional risk factors are present, cholesterol greater   than   240 mg/dL, triglycerides greater than 150 mg/dL, or HDL less than 40 mg/dL.     10/24/2012 204 (H) 0 - 200 mg/dL Final     Comment:     LDL Cholesterol is the primary guide to therapy.   The NCEP recommends further evaluation of: patients " with cholesterol greater   than 200 mg/dL if additional risk factors are present, cholesterol greater   than   240 mg/dL, triglycerides greater than 150 mg/dL, or HDL less than 40 mg/dL.    11/4/15   Total= 261, Triglycerides=96, HDL=83, PJF=097, FBS=91    Last Mammo: 3/27/17, Result: normal, Next Mammo:  2018  Pap: (  Lab Results   Component Value Date    PAP LSIL 10/09/2017    PAP LSIL 2017    PAP ASC-US 2017   10/9/17 LSIL HPVother +-due for repeat in April  Colonoscopy:  16, Result: normal, Next Colonoscopy: 4 years.  Dexa:  never    Health maintenance updated:  yes    HISTORY:  Obstetric History       T3      L3     SAB0   TAB0   Ectopic0   Multiple0   Live Births0       # Outcome Date GA Lbr Cj/2nd Weight Sex Delivery Anes PTL Lv   3 Term            2 Term            1 Term                   Patient Active Problem List   Diagnosis     CARDIOVASCULAR SCREENING; LDL GOAL LESS THAN 130     Migraine headache     GERD (gastroesophageal reflux disease)     Phlebitis     MEDIAL MENISCUS TEAR - left     Colitis     Familial hyperlipoproteinemia     Paresthesias, hands & feet     Shoulder impingement - bilateral     Cervical radiculopathy     Vertiginous migraine, acephalgic migraine episodes also     History of hysterectomy including cervix     Past Surgical History:   Procedure Laterality Date     AS ABLATION, ENDOMETRIAL, THERMAL, W/O HYSTEROSCOPIC GUIDANCE      Her Option Endo Ablation     CL AFF SURGICAL PATHOLOGY       COLONOSCOPY WITH CO2 INSUFFLATION N/A 2016    Procedure: COLONOSCOPY WITH CO2 INSUFFLATION;  Surgeon: Ana Romo MD;  Location:  OR     COMBINED ESOPHAGOSCOPY, GASTROSCOPY, DUODENOSCOPY (EGD) WITH CO2 INSUFFLATION N/A 2016    Procedure: COMBINED ESOPHAGOSCOPY, GASTROSCOPY, DUODENOSCOPY (EGD) WITH CO2 INSUFFLATION;  Surgeon: Ana Romo MD;  Location:  OR     ENDOSCOPIC LIGATION VEIN(S)      Right      ESOPHAGOSCOPY, GASTROSCOPY, DUODENOSCOPY (EGD), COMBINED N/A 7/26/2016    Procedure: COMBINED ESOPHAGOSCOPY, GASTROSCOPY, DUODENOSCOPY (EGD), BIOPSY SINGLE OR MULTIPLE;  Surgeon: Ana Romo MD;  Location: [facility] OR     HC DILATION/CURETTAGE DIAG/THER NON OB  2002     HC KNEE SCOPE,SINGLE MENISECTOMY  7/15/11    Left - LATERAL AND MEDIAL MENISECTOMIES - NOT SINGLE!!!     HC REMOVAL OF TONSILS,12+ Y/O  30 years ago     HYSTERECTOMY, SUPRACERVICAL VAGINAL  2006     LASER CO2 VAGINA  2012    at [facility]     NECK SURGERY  2014    cervical fusion     TUBAL LIGATION  1993      Social History   Substance Use Topics     Smoking status: Former Smoker     Packs/day: 0.50     Years: 3.00     Types: Cigarettes     Smokeless tobacco: Never Used     Alcohol use 1.2 - 1.8 oz/week     2 - 3 Standard drinks or equivalent per week      Comment: 2-4 drinks/week      Problem (# of Occurrences) Relation (Name,Age of Onset)    Alcohol/Drug (1) Maternal Grandmother    Allergies (1) Daughter    Arthritis (2) Mother, Father    Asthma (2) Son (Santana), Daughter    CANCER (1) Maternal Grandmother: Bone cancer    CEREBROVASCULAR DISEASE (2) Father, Paternal Grandfather    GASTROINTESTINAL DISEASE (1) Mother: Kidney    HEART DISEASE (2) Paternal Grandmother, Son (Santana)    Hypertension (1) Mother    Lipids (1) Father    Neurologic Disorder (3) Paternal Grandmother: migraines, Daughter: migraines, Son (Skyler): migraines       Negative family history of: Blood Disease, Anesthesia Reaction            Current Outpatient Prescriptions   Medication Sig     estradiol (ESTRACE) 1 MG tablet Take 1 tablet (1 mg) by mouth daily     traZODone (DESYREL) 50 MG tablet Take 1 tablet (50 mg) by mouth At Bedtime     Cyclobenzaprine HCl (FLEXERIL PO)      Cyanocobalamin (VITAMIN B12 PO)      Cholecalciferol (VITAMIN D3 PO) Take by mouth daily     simvastatin (ZOCOR) 40 MG tablet Take 1 tablet (40 mg) by mouth At Bedtime     omeprazole  "(PRILOSEC) 20 MG capsule Take 1 capsule by mouth daily.     aspirin 81 MG tablet Take 1 tablet by mouth daily.     [DISCONTINUED] estradiol (ESTRACE) 1 MG tablet Take 1 tablet (1 mg) by mouth daily     No current facility-administered medications for this visit.      Allergies   Allergen Reactions     Verapamil Unknown       Past medical, surgical, social and family histories were reviewed and updated in EPIC.    ROS:   12 point review of systems negative other than symptoms noted below.  Gastrointestinal: Bloating, Constipation and Heartburn  Genitourinary: Night Sweats  Skin: Skin Dryness  Musculoskeletal: Joint Pain  Psychiatric: Difficulty Sleeping    EXAM:  /78  Pulse 64  Ht 5' 4\" (1.626 m)  Wt 132 lb (59.9 kg)  BMI 22.66 kg/m2   BMI: Body mass index is 22.66 kg/(m^2).    PHYSICAL EXAM:  Constitutional:  Appearance: Well nourished, well developed, alert, in no acute distress  Neck:  Lymph Nodes:  No lymphadenopathy present    Thyroid:  Gland size normal, nontender, no nodules or masses present  on palpation  Chest:  Respiratory Effort:  Breathing unlabored  Cardiovascular:    Heart: Auscultation:  Regular rate, normal rhythm, no murmurs present  Breasts: Inspection of Breasts:  No lymphadenopathy present., Palpation of Breasts and Axillae:  No masses present on palpation, no breast tenderness., Axillary Lymph Nodes:  No lymphadenopathy present. and No nodularity, asymmetry or nipple discharge bilaterally.  Gastrointestinal:   Abdominal Examination:  Abdomen nontender to palpation, tone normal without rigidity or guarding, no masses present, umbilicus without lesions   Liver and Spleen:  No hepatomegaly present, liver nontender to palpation    Hernias:  No hernias present  Lymphatic: Lymph Nodes:  No other lymphadenopathy present  Skin:  General Inspection:  No rashes present, no lesions present, no areas of  discoloration    Genitalia and Groin:  No rashes present, no lesions present, no areas of "  discoloration, no masses present  Neurologic/Psychiatric:    Mental Status:  Oriented X3     Pelvic Exam:  External Genitalia:     Normal appearance for age, no discharge present, no tenderness present, no inflammatory lesions present, color normal  Vagina:     Normal vaginal vault without central or paravaginal defects, no discharge present, no inflammatory lesions present, no masses present  Bladder:     Nontender to palpation  Urethra:   Urethral Body:  Urethra palpation normal, urethra structural support normal   Urethral Meatus:  No erythema or lesions present  Cervix:     Surgically absent  Uterus:     Surgically absent  Adnexa:     No adnexal tenderness present, no adnexal masses present  Perineum:     Perineum within normal limits, no evidence of trauma, no rashes or skin lesions present  Anus:     Anus within normal limits, no hemorrhoids present  Inguinal Lymph Nodes:     No lymphadenopathy present  Pubic Hair:     Normal pubic hair distribution for age  Genitalia and Groin:     No rashes present, no lesions present, no areas of discoloration, no masses present      COUNSELING:   Special attention given to:        Regular exercise       Healthy diet/nutrition       Colon cancer screening       (Lu)menopause management    BMI: Body mass index is 22.66 kg/(m^2).        ASSESSMENT:  52 year old female with satisfactory annual exam.    ICD-10-CM    1. Encounter for gynecological examination without abnormal finding Z01.419 Pap imaged thin layer screen with HPV - recommended age 30 - 65 years (select HPV order below)     HPV High Risk Types DNA Cervical   2. Symptomatic menopausal or female climacteric states N95.1 estradiol (ESTRACE) 1 MG tablet   3. Primary insomnia F51.01 traZODone (DESYREL) 50 MG tablet     DISCONTINUED: zolpidem (AMBIEN) 5 MG tablet       PLAN:  Normal post menopausal, LAVH exam. Hx of LSIL +other HR HPV. Ok to continue oral E2 tabs x1 year. Will try trazodone as her ambien use is  inappropriate and seems to be getting excessive. Pap repeated today.    MIGUEL Macias CNP

## 2017-12-29 DIAGNOSIS — F51.01 PRIMARY INSOMNIA: ICD-10-CM

## 2017-12-29 RX ORDER — ZOLPIDEM TARTRATE 5 MG/1
5 TABLET ORAL
Qty: 30 TABLET | Refills: 0 | Status: SHIPPED | OUTPATIENT
Start: 2017-12-29 | End: 2018-01-29

## 2017-12-29 ASSESSMENT — ANXIETY QUESTIONNAIRES: GAD7 TOTAL SCORE: 1

## 2017-12-29 NOTE — TELEPHONE ENCOUNTER
Pt states the trazodone did not help her, she was up until 4am, was not helpful. Pt states her daughter told her she did try this before and it didn't help. Pt states she wants to switch back to Ambien.   Routing to Lauren Hurst to review and advise.

## 2018-01-03 LAB
COPATH REPORT: ABNORMAL
PAP: ABNORMAL

## 2018-01-29 DIAGNOSIS — F51.01 PRIMARY INSOMNIA: ICD-10-CM

## 2018-01-29 NOTE — TELEPHONE ENCOUNTER
zolpidem (AMBIEN) 5 MG tablet   Last Written Prescription Date:  12/29/17  Last Fill Quantity: 30,   # refills: 0  Last Office Visit with [facility] primary care provider:  12/28/17  Future Office visit: none    Routing refill request to provider for review/approval because:  Drug not on the [facility], [facility] or [facility] refill protocol or controlled substance. Routing to Lauren Bro.

## 2018-01-30 RX ORDER — ZOLPIDEM TARTRATE 5 MG/1
5 TABLET ORAL
Qty: 30 TABLET | Refills: 0 | Status: SHIPPED | OUTPATIENT
Start: 2018-01-30 | End: 2018-03-01

## 2018-03-01 DIAGNOSIS — F51.01 PRIMARY INSOMNIA: ICD-10-CM

## 2018-03-01 RX ORDER — ZOLPIDEM TARTRATE 5 MG/1
5 TABLET ORAL
Qty: 30 TABLET | Refills: 0 | Status: SHIPPED | OUTPATIENT
Start: 2018-03-01 | End: 2018-04-05

## 2018-03-01 NOTE — TELEPHONE ENCOUNTER
zolpidem (AMBIEN) 5 MG tablet     Last Written Prescription Date:  1/30/18  Last Fill Quantity: 30,   # refills: 0  Last Office Visit with [facility] primary care provider:  12/28/17  Future Office visit: none    Routing refill request to provider for review/approval because:  Drug not on the [facility], [facility] or [facility] refill protocol or controlled substance. Routing to Lauren Bro.

## 2018-04-05 DIAGNOSIS — F51.01 PRIMARY INSOMNIA: ICD-10-CM

## 2018-04-05 RX ORDER — ZOLPIDEM TARTRATE 5 MG/1
5 TABLET ORAL
Qty: 30 TABLET | Refills: 0 | Status: SHIPPED | OUTPATIENT
Start: 2018-04-05 | End: 2018-05-14

## 2018-04-05 NOTE — TELEPHONE ENCOUNTER
Pt calling to inquire why her request for refill has not been done. (I see no requests from her pharm requesting refill)  Will send to Lauren BRINK to advise        Ambien 5mg      Last Written Prescription Date:  03/1/2018  Last Fill Quantity: 30,   # refills: 0  Last Office Visit with [facility] primary care provider:  12/28/17 annual  Future Office visit: 04/23/18 mammo    Route to Lauren guerin is pended

## 2018-04-26 ENCOUNTER — RADIANT APPOINTMENT (OUTPATIENT)
Dept: MAMMOGRAPHY | Facility: CLINIC | Age: 53
End: 2018-04-26
Attending: NURSE PRACTITIONER
Payer: COMMERCIAL

## 2018-04-26 DIAGNOSIS — Z12.31 VISIT FOR SCREENING MAMMOGRAM: ICD-10-CM

## 2018-04-26 PROCEDURE — 77067 SCR MAMMO BI INCL CAD: CPT | Mod: TC

## 2018-05-14 DIAGNOSIS — F51.01 PRIMARY INSOMNIA: ICD-10-CM

## 2018-05-14 NOTE — TELEPHONE ENCOUNTER
Requested Prescriptions   Pending Prescriptions Disp Refills     zolpidem (AMBIEN) 5 MG tablet 30 tablet 0     Sig: Take 1 tablet (5 mg) by mouth nightly as needed for sleep    There is no refill protocol information for this order        Last Written Prescription Date:  4/8/18  Last Fill Quantity: 30,  # refills: 0   Last office visit: 12/28/2017 with prescribing provider:    Future Office Visit:  none

## 2018-05-15 RX ORDER — ZOLPIDEM TARTRATE 5 MG/1
5 TABLET ORAL
Qty: 30 TABLET | Refills: 0 | Status: SHIPPED | OUTPATIENT
Start: 2018-05-15 | End: 2018-06-21

## 2018-06-21 DIAGNOSIS — F51.01 PRIMARY INSOMNIA: ICD-10-CM

## 2018-06-21 RX ORDER — ZOLPIDEM TARTRATE 5 MG/1
5 TABLET ORAL
Qty: 30 TABLET | Refills: 0 | Status: SHIPPED | OUTPATIENT
Start: 2018-06-21 | End: 2018-07-23

## 2018-06-21 NOTE — TELEPHONE ENCOUNTER
Requested Prescriptions   Pending Prescriptions Disp Refills     zolpidem (AMBIEN) 5 MG tablet 30 tablet 0     Sig: Take 1 tablet (5 mg) by mouth nightly as needed for sleep    There is no refill protocol information for this order        Last Written Prescription Date:  5/15/2018  Last Fill Quantity: 30,  # refills: 0   Last office visit: 12/28/2017 with prescribing provider:  ABISAI HERNÁNDEZ   Future Office Visit:   Next 5 appointments (look out 90 days)     Jun 28, 2018  1:30 PM CDT   Office Visit with MIGUEL Macias CNP   [facility] ([facility])    [address] [id number] [phone]

## 2018-06-25 DIAGNOSIS — F51.01 PRIMARY INSOMNIA: ICD-10-CM

## 2018-06-25 NOTE — TELEPHONE ENCOUNTER
"Requested Prescriptions   Pending Prescriptions Disp Refills     traZODone (DESYREL) 50 MG tablet 90 tablet 1     Sig: Take 1 tablet (50 mg) by mouth At Bedtime    Serotonin Modulators Passed    6/25/2018  8:20 AM       Passed - Recent (12 mo) or future (30 days) visit within the authorizing provider's specialty    Patient had office visit in the last 12 months or has a visit in the next 30 days with authorizing provider or within the authorizing provider's specialty.  See \"Patient Info\" tab in inbasket, or \"Choose Columns\" in Meds & Orders section of the refill encounter.           Passed - Patient is age 18 or older       Passed - No active pregnancy on record       Passed - No positive pregnancy test in past 12 months        Last Written Prescription Date:  12/28/17  Last Fill Quantity: 90,  # refills: 1   Last office visit: 12/28/2017 with prescribing provider:     Future Office Visit:   Next 5 appointments (look out 90 days)     Jun 28, 2018  1:30 PM CDT   Office Visit with MIGUEL Macias CNP   [facility] ([facility])    [address] [id number]   [phone]                   "

## 2018-06-26 DIAGNOSIS — F51.01 PRIMARY INSOMNIA: ICD-10-CM

## 2018-06-27 RX ORDER — TRAZODONE HYDROCHLORIDE 50 MG/1
50 TABLET, FILM COATED ORAL AT BEDTIME
Qty: 90 TABLET | Refills: 0 | Status: SHIPPED | OUTPATIENT
Start: 2018-06-27 | End: 2018-07-11

## 2018-06-27 RX ORDER — TRAZODONE HYDROCHLORIDE 50 MG/1
TABLET, FILM COATED ORAL
Qty: 90 TABLET | Refills: 1 | OUTPATIENT
Start: 2018-06-27

## 2018-07-11 ENCOUNTER — OFFICE VISIT (OUTPATIENT)
Dept: OBGYN | Facility: CLINIC | Age: 53
End: 2018-07-11
Payer: COMMERCIAL

## 2018-07-11 VITALS
SYSTOLIC BLOOD PRESSURE: 114 MMHG | BODY MASS INDEX: 22.02 KG/M2 | DIASTOLIC BLOOD PRESSURE: 72 MMHG | HEIGHT: 64 IN | HEART RATE: 68 BPM | WEIGHT: 129 LBS

## 2018-07-11 DIAGNOSIS — R87.610 ASCUS WITH POSITIVE HIGH RISK HPV CERVICAL: Primary | ICD-10-CM

## 2018-07-11 DIAGNOSIS — F51.01 PRIMARY INSOMNIA: ICD-10-CM

## 2018-07-11 DIAGNOSIS — R87.810 ASCUS WITH POSITIVE HIGH RISK HPV CERVICAL: Primary | ICD-10-CM

## 2018-07-11 PROCEDURE — 88175 CYTOPATH C/V AUTO FLUID REDO: CPT | Performed by: NURSE PRACTITIONER

## 2018-07-11 PROCEDURE — 99213 OFFICE O/P EST LOW 20 MIN: CPT | Performed by: NURSE PRACTITIONER

## 2018-07-11 PROCEDURE — 87624 HPV HI-RISK TYP POOLED RSLT: CPT | Performed by: NURSE PRACTITIONER

## 2018-07-11 NOTE — PROGRESS NOTES
SUBJECTIVE:                                                   Katherine Tipton is a 53 year old female who presents to clinic today for the following health issue(s):  Patient presents with:  Repeat Pap Smear: 17 ASCUS HPV other +        HPI:  Pt here today for pap recheck. Her last pap in December was abnormal again. She is stressed with aging parents and work.  She still struggles with vaginal dryness and insomnia.     She has tried multiple vaginal inserts/hormonal and non without relief for the dryness.    She takes Ambien nightly and gets 4 hours of sleep per night, sometimes less.    No LMP recorded. Patient has had a hysterectomy..   Patient is sexually active, .  Using hysterectomy for contraception.    reports that she has quit smoking. Her smoking use included Cigarettes. She has a 1.50 pack-year smoking history. She has never used smokeless tobacco.    STD testing offered?  Declined    Health maintenance updated:  yes    Today's PHQ-2 Score:   PHQ-2 (  [employer]) 2018   Q1: Little interest or pleasure in doing things 0   Q2: Feeling down, depressed or hopeless 0   PHQ-2 Score 0     Today's PHQ-9 Score:   PHQ-9 SCORE 2017   Total Score 1     Today's PHILOMENA-7 Score:   PHILOMENA-7 SCORE 2017   Total Score 1       Problem list and histories reviewed & adjusted, as indicated.  Additional history: as documented.    Patient Active Problem List   Diagnosis     CARDIOVASCULAR SCREENING; LDL GOAL LESS THAN 130     Migraine headache     GERD (gastroesophageal reflux disease)     Phlebitis     MEDIAL MENISCUS TEAR - left     Colitis     Familial hyperlipoproteinemia     Paresthesias, hands & feet     Shoulder impingement - bilateral     Cervical radiculopathy     Vertiginous migraine, acephalgic migraine episodes also     History of hysterectomy including cervix     Past Surgical History:   Procedure Laterality Date     AS ABLATION, ENDOMETRIAL, THERMAL, W/O HYSTEROSCOPIC GUIDANCE       Her Option Endo Ablation     CL AFF SURGICAL PATHOLOGY       COLONOSCOPY WITH CO2 INSUFFLATION N/A 7/26/2016    Procedure: COLONOSCOPY WITH CO2 INSUFFLATION;  Surgeon: Ana Romo MD;  Location: [facility] OR     COMBINED ESOPHAGOSCOPY, GASTROSCOPY, DUODENOSCOPY (EGD) WITH CO2 INSUFFLATION N/A 7/26/2016    Procedure: COMBINED ESOPHAGOSCOPY, GASTROSCOPY, DUODENOSCOPY (EGD) WITH CO2 INSUFFLATION;  Surgeon: Ana Romo MD;  Location: [facility] OR     ENDOSCOPIC LIGATION VEIN(S)  1995    Right     ESOPHAGOSCOPY, GASTROSCOPY, DUODENOSCOPY (EGD), COMBINED N/A 7/26/2016    Procedure: COMBINED ESOPHAGOSCOPY, GASTROSCOPY, DUODENOSCOPY (EGD), BIOPSY SINGLE OR MULTIPLE;  Surgeon: Ana Romo MD;  Location: [facility] OR     HC DILATION/CURETTAGE DIAG/THER NON OB  2002     HC KNEE SCOPE,SINGLE MENISECTOMY  7/15/11    Left - LATERAL AND MEDIAL MENISECTOMIES - NOT SINGLE!!!     HC REMOVAL OF TONSILS,12+ Y/O  30 years ago     HYSTERECTOMY VAGINAL  04/11/2006    cervix removed     LASER CO2 VAGINA  2012    at [facility]     NECK SURGERY  2014    cervical fusion     TUBAL LIGATION  1993      Social History   Substance Use Topics     Smoking status: Former Smoker     Packs/day: 0.50     Years: 3.00     Types: Cigarettes     Smokeless tobacco: Never Used     Alcohol use 1.2 - 1.8 oz/week     2 - 3 Standard drinks or equivalent per week      Comment: 2-4 drinks/week      Problem (# of Occurrences) Relation (Name,Age of Onset)    Alcohol/Drug (1) Maternal Grandmother    Allergies (1) Daughter    Arthritis (2) Mother, Father    Asthma (2) Son (Santana), Daughter    Cancer (1) Maternal Grandmother: Bone cancer    Cerebrovascular Disease (2) Father, Paternal Grandfather    GASTROINTESTINAL DISEASE (1) Mother: Kidney    HEART DISEASE (2) Paternal Grandmother, Son (Santana)    Hypertension (1) Mother    Lipids (1) Father    Neurologic Disorder (3) Paternal Grandmother: migraines, Daughter: migraines, Son  "(Skyler): migraines       Negative family history of: Blood Disease, Anesthesia Reaction            Current Outpatient Prescriptions   Medication Sig     amitriptyline (ELAVIL) 25 MG tablet Take 1 tablet (25 mg) by mouth nightly as needed for sleep     aspirin 81 MG tablet Take 1 tablet by mouth daily.     Cholecalciferol (VITAMIN D3 PO) Take by mouth daily     Cyanocobalamin (VITAMIN B12 PO)      Cyclobenzaprine HCl (FLEXERIL PO)      estradiol (ESTRACE) 1 MG tablet Take 1 tablet (1 mg) by mouth daily     omeprazole (PRILOSEC) 20 MG capsule Take 1 capsule by mouth daily.     simvastatin (ZOCOR) 40 MG tablet Take 1 tablet (40 mg) by mouth At Bedtime     zolpidem (AMBIEN) 5 MG tablet Take 1 tablet (5 mg) by mouth nightly as needed for sleep     No current facility-administered medications for this visit.      Allergies   Allergen Reactions     Verapamil Unknown       ROS:  12 point review of systems negative other than symptoms noted below.  Genitourinary: Night Sweats and Vaginal Dryness  Skin: New Skin Lesions and Skin Dryness  Psychiatric: Difficulty Sleeping    OBJECTIVE:     /72  Pulse 68  Ht 5' 4\" (1.626 m)  Wt 129 lb (58.5 kg)  BMI 22.14 kg/m2  Body mass index is 22.14 kg/(m^2).    Exam:  Constitutional:  Appearance: Well nourished, well developed alert, in no acute distress  Neurologic/Psychiatric:  Mental Status:  Oriented X3   Pelvic Exam:  External Genitalia:     Normal appearance for age, no discharge present, no tenderness present, no inflammatory lesions present, color normal  Vagina:     Normal vaginal vault without central or paravaginal defects, no discharge present, no inflammatory lesions present, no masses present  Bladder:     Nontender to palpation  Urethra:   Urethral Body:  Urethra palpation normal, urethra structural support normal   Urethral Meatus:  No erythema or lesions present  Cervix:     Surgically absent  Uterus:     Surgically absent  Adnexa:     No adnexal tenderness " present, no adnexal masses present  Perineum:     Perineum within normal limits, no evidence of trauma, no rashes or skin lesions present  Anus:     Anus within normal limits, no hemorrhoids present  Inguinal Lymph Nodes:     No lymphadenopathy present  Pubic Hair:     Normal pubic hair distribution for age  Genitalia and Groin:     No rashes present, no lesions present, no areas of discoloration, no masses present       In-Clinic Test Results:  No results found for this or any previous visit (from the past 24 hour(s)).    ASSESSMENT/PLAN:                                                        ICD-10-CM    1. ASCUS with positive high risk HPV cervical R87.610 Pap imaged thin layer screen with HPV - recommended age 30 - 65    R87.810 HPV High Risk Types DNA Cervical   2. Primary insomnia F51.01 amitriptyline (ELAVIL) 25 MG tablet       There are no Patient Instructions on file for this visit.    Pt with persistent vaginal HPV other HR+, will continue to monitor. Last colpo in 10/2017. Will try amitriptyline for insomnia. Pt will call if it works. She knows not to take it with her ambien.    MIGUEL Macias [facility] [place]

## 2018-07-11 NOTE — MR AVS SNAPSHOT
"              After Visit Summary   7/11/2018    Katherine Tipton    MRN: [id number]           Patient Information     Date Of Birth          1965        Visit Information        Provider Department      7/11/2018 9:00 AM Lauren Bro APRN CNP [facility]        Today's Diagnoses     ASCUS with positive high risk HPV cervical    -  1    Primary insomnia           Follow-ups after your visit        Follow-up notes from your care team     Return in about 6 months (around 1/11/2019).      Who to contact     If you have questions or need follow up information about today's clinic visit or your schedule please contact [facility] directly at [phone].  Normal or non-critical lab and imaging results will be communicated to you by [employer], letter or phone within 4 business days after the clinic has received the results. If you do not hear from us within 7 days, please contact the clinic through [employer] or phone. If you have a critical or abnormal lab result, we will notify you by phone as soon as possible.  Submit refill requests through [employer] or call your pharmacy and they will forward the refill request to us. Please allow 3 business days for your refill to be completed.          Additional Information About Your Visit        MyChart Information     [employer] gives you secure access to your electronic health record. If you see a primary care provider, you can also send messages to your care team and make appointments. If you have questions, please call your primary care clinic.  If you do not have a primary care provider, please call [phone] and they will assist you.        Care EveryWhere ID     This is your Care EveryWhere ID. This could be used by other organizations to access your [place] medical records  [id number]        Your Vitals Were     Pulse Height BMI (Body Mass Index)             68 5' 4\" (1.626 m) 22.14 kg/m2          Blood Pressure from Last " 3 Encounters:   07/11/18 114/72   12/28/17 112/78   10/09/17 118/76    Weight from Last 3 Encounters:   07/11/18 129 lb (58.5 kg)   12/28/17 132 lb (59.9 kg)   10/09/17 131 lb (59.4 kg)              We Performed the Following     HPV High Risk Types DNA Cervical     Pap imaged thin layer screen with HPV - recommended age 30 - 65          Today's Medication Changes          These changes are accurate as of 7/11/18  9:26 AM.  If you have any questions, ask your nurse or doctor.               Start taking these medicines.        Dose/Directions    amitriptyline 25 MG tablet   Commonly known as:  ELAVIL   Used for:  Primary insomnia        Dose:  25 mg   Take 1 tablet (25 mg) by mouth nightly as needed for sleep   Quantity:  15 tablet   Refills:  0            Where to get your medicines      These medications were sent to [place] [address] [facility] - [place], [place] - [address] [facility]  [address] [facility] [address]     Phone:  [phone]     amitriptyline 25 MG tablet                Primary Care Provider Office Phone # Fax #    [facility] [phone]       [address]  [facility] [address]        Equal Access to Services     JOHANNA MONTEIRO : Hadii joselito joseph hadasho Soomaali, waaxda luqadaha, qaybta kaalmada adeegyada, alfredo rush. So [facility] [phone].    ATENCIÓN: Si habla español, tiene a lundberg disposición servicios gratuitos de asistencia lingüística. Llame al [phone].    We comply with applicable federal civil rights laws and [place] laws. We do not discriminate on the basis of race, color, national origin, age, disability, sex, sexual orientation, or gender identity.            Thank you!     Thank you for choosing [facility]  for your care. Our goal is always to provide you with excellent care. Hearing back from our patients is one way we can continue to improve our services. Please take a few minutes to  complete the written survey that you may receive in the mail after your visit with us. Thank you!             Your Updated Medication List - Protect others around you: Learn how to safely use, store and throw away your medicines at www.disposemymeds.org.          This list is accurate as of 7/11/18  9:26 AM.  Always use your most recent med list.                   Brand Name Dispense Instructions for use Diagnosis    amitriptyline 25 MG tablet    ELAVIL    15 tablet    Take 1 tablet (25 mg) by mouth nightly as needed for sleep    Primary insomnia       aspirin 81 MG tablet     90 tablet    Take 1 tablet by mouth daily.    Vertiginous migraine       estradiol 1 MG tablet    ESTRACE    90 tablet    Take 1 tablet (1 mg) by mouth daily    Symptomatic menopausal or female climacteric states       FLEXERIL PO           omeprazole 20 MG CR capsule    priLOSEC    30 capsule    Take 1 capsule by mouth daily.    GERD (gastroesophageal reflux disease)       simvastatin 40 MG tablet    ZOCOR    90 tablet    Take 1 tablet (40 mg) by mouth At Bedtime    Hyperlipidemia LDL goal <100       VITAMIN B12 PO           VITAMIN D3 PO      Take by mouth daily        zolpidem 5 MG tablet    AMBIEN    30 tablet    Take 1 tablet (5 mg) by mouth nightly as needed for sleep    Primary insomnia

## 2018-07-13 LAB
COPATH REPORT: NORMAL
PAP: NORMAL

## 2018-07-17 LAB
FINAL DIAGNOSIS: NORMAL
HPV HR 12 DNA CVX QL NAA+PROBE: NEGATIVE
HPV16 DNA SPEC QL NAA+PROBE: NEGATIVE
HPV18 DNA SPEC QL NAA+PROBE: NEGATIVE
SPECIMEN DESCRIPTION: NORMAL
SPECIMEN SOURCE CVX/VAG CYTO: NORMAL

## 2018-07-23 DIAGNOSIS — F51.01 PRIMARY INSOMNIA: ICD-10-CM

## 2018-07-24 NOTE — TELEPHONE ENCOUNTER
Requested Prescriptions   Pending Prescriptions Disp Refills     zolpidem (AMBIEN) 5 MG tablet [Pharmacy Med Name: ZOLPIDEM TARTRATE 5 MG TABLET] 30 tablet 0     Sig: TAKE 1 TABLET BY MOUTH NIGHTLY AS NEEDED FOR SLEEP    There is no refill protocol information for this order      Routing to Lauren Dixon  Last Written Prescription Date:  6/21/18  Last Fill Quantity: 30,   # refills: 0  Last Office Visit with [facility] primary care provider:  7/11/18  Future Office visit: none    Routing refill request to provider for review/approval because:  Drug not on the [facility], [facility] or [facility] refill protocol or controlled substance

## 2018-07-25 RX ORDER — ZOLPIDEM TARTRATE 5 MG/1
TABLET ORAL
Qty: 30 TABLET | Refills: 0 | Status: SHIPPED | OUTPATIENT
Start: 2018-07-25 | End: 2018-09-06

## 2018-08-09 DIAGNOSIS — F51.01 PRIMARY INSOMNIA: ICD-10-CM

## 2018-08-09 NOTE — TELEPHONE ENCOUNTER
"Requested Prescriptions   Pending Prescriptions Disp Refills     amitriptyline (ELAVIL) 25 MG tablet [Pharmacy Med Name: AMITRIPTYLINE HCL 25 MG TAB] 15 tablet 0     Sig: TAKE 1 TABLET (25 MG) BY MOUTH NIGHTLY AS NEEDED FOR SLEEP    Tricyclic Agents ( Annual appt and no PHQ9) Passed    8/9/2018 10:32 AM       Passed - Blood Pressure under 140/90 in past 12 mos    BP Readings from Last 3 Encounters:   07/11/18 114/72   12/28/17 112/78   10/09/17 118/76                Passed - Recent (12 mo) or future (30 days) visit within authorizing provider's specialty    Patient had office visit in the last 12 months or has a visit in the next 30 days with authorizing provider or within the authorizing provider's specialty.  See \"Patient Info\" tab in inbasket, or \"Choose Columns\" in Meds & Orders section of the refill encounter.           Passed - Patient is age 18 or older       Passed - Patient is not pregnant       Passed - No positive pregnancy test on record in past 12 mos        Last Written Prescription Date:  7/11/18  Last Fill Quantity: 15 tablet # refills: 0   Last office visit: 7/11/2018 with prescribing provider:  ABISAI   Future Office Visit:      "

## 2018-08-14 NOTE — TELEPHONE ENCOUNTER
Please clarify with patient if she is taking ambien or amitriptyline. She should not be taking both. Her ambien was filled at the end of July.

## 2018-08-14 NOTE — TELEPHONE ENCOUNTER
Called and left detailed vm asking pt to clarify if she is taking ambien or amitriptyline for sleep, she should not be taking both medications. She will not refill amitriptyline at this time until pt calls back.

## 2018-08-20 NOTE — TELEPHONE ENCOUNTER
LM on PHI asking to call us back in regards to her medication; if she's taking the ambien or amitripyline. Per Lauren Bro, she shouldn't be on both. We received another refill request for the amitriptyline so informed patient on message that we won't be sending any refills at this time until she calls us back to let us know the required information. She can talk to a nurse when she calls.

## 2018-08-20 NOTE — TELEPHONE ENCOUNTER
Pt is taking Ambien for sleep.  She had tried Amitriptyline once for sleep and slept sound all night until 1030 am but then felt drowsy.  She is just using ambien.  She did not request a refill for this and it must be on auto refill.  She plans to review medications with SM at her next appt this fall.  No needs at this time.

## 2018-09-06 DIAGNOSIS — F51.01 PRIMARY INSOMNIA: ICD-10-CM

## 2018-09-06 RX ORDER — ZOLPIDEM TARTRATE 5 MG/1
TABLET ORAL
Qty: 30 TABLET | Refills: 0 | Status: SHIPPED | OUTPATIENT
Start: 2018-09-06 | End: 2018-10-16

## 2018-10-16 DIAGNOSIS — F51.01 PRIMARY INSOMNIA: ICD-10-CM

## 2018-10-16 RX ORDER — ZOLPIDEM TARTRATE 5 MG/1
5 TABLET ORAL
Qty: 30 TABLET | Refills: 0 | Status: SHIPPED | OUTPATIENT
Start: 2018-10-16 | End: 2018-11-14

## 2018-10-16 NOTE — TELEPHONE ENCOUNTER
zolpidem (AMBIEN) 5 MG tablet    Last Written Prescription Date:  9/6/18  Last Fill Quantity: 30,   # refills: 0  Last Office Visit with G primary care provider:  12/28/17  Future Office visit: none    Routing refill request to provider for review/approval because:  Drug not on the [facility], [facility] or [facility] refill protocol or controlled substance  Routing to Lauren Bro.

## 2018-11-14 DIAGNOSIS — F51.01 PRIMARY INSOMNIA: ICD-10-CM

## 2018-11-14 RX ORDER — ZOLPIDEM TARTRATE 5 MG/1
TABLET ORAL
Qty: 30 TABLET | Refills: 0 | Status: SHIPPED | OUTPATIENT
Start: 2018-11-14 | End: 2018-12-18

## 2018-11-14 NOTE — TELEPHONE ENCOUNTER
Requested Prescriptions   Pending Prescriptions Disp Refills     zolpidem (AMBIEN) 5 MG tablet [Pharmacy Med Name: Zolpidem Tartrate Oral Tablet 5 MG] 30 tablet 0     Sig: TAKE ONE TABLET BY MOUTH NIGHTLY AS NEEDED FOR SLEEP    There is no refill protocol information for this order        Last Written Prescription Date:  10/16/18  Last Fill Quantity: 30,  # refills: 0   Last office visit: 7/11/2018 with prescribing provider:  Lauren Bro NP     Routed to  to advise on refill request

## 2018-12-05 DIAGNOSIS — N95.1 SYMPTOMATIC MENOPAUSAL OR FEMALE CLIMACTERIC STATES: ICD-10-CM

## 2018-12-05 RX ORDER — ESTRADIOL 1 MG/1
1 TABLET ORAL DAILY
Qty: 30 TABLET | Refills: 1 | Status: SHIPPED | OUTPATIENT
Start: 2018-12-05 | End: 2019-01-10

## 2018-12-05 NOTE — TELEPHONE ENCOUNTER
"Requested Prescriptions   Pending Prescriptions Disp Refills     estradiol (ESTRACE) 1 MG tablet 90 tablet 3     Sig: Take 1 tablet (1 mg) by mouth daily    Hormone Replacement Therapy Passed    12/5/2018 10:03 AM       Passed - Blood pressure under 140/90 in past 12 months    BP Readings from Last 3 Encounters:   07/11/18 114/72   12/28/17 112/78   10/09/17 118/76                Passed - Recent (12 mo) or future (30 days) visit within the authorizing provider's specialty    Patient had office visit in the last 12 months or has a visit in the next 30 days with authorizing provider or within the authorizing provider's specialty.  See \"Patient Info\" tab in inbasket, or \"Choose Columns\" in Meds & Orders section of the refill encounter.             Passed - Patient has mammogram in past 2 years on file if age 50-75       Passed - Patient is 18 years of age or older       Passed - No active pregnancy on record       Passed - No positive pregnancy test on record in past 12 months        Last Written Prescription Date:  12/28/17  Last Fill Quantity: 90,  # refills: 3   Last office visit: 7/11/2018 with prescribing provider:  Lauren Bro NP   Future Office Visit:  None    "

## 2018-12-18 DIAGNOSIS — F51.01 PRIMARY INSOMNIA: ICD-10-CM

## 2018-12-18 RX ORDER — ZOLPIDEM TARTRATE 5 MG/1
TABLET ORAL
Qty: 30 TABLET | Refills: 0 | Status: SHIPPED | OUTPATIENT
Start: 2018-12-18 | End: 2019-01-10

## 2019-01-10 ENCOUNTER — OFFICE VISIT (OUTPATIENT)
Dept: OBGYN | Facility: CLINIC | Age: 54
End: 2019-01-10
Payer: COMMERCIAL

## 2019-01-10 VITALS
HEIGHT: 64 IN | DIASTOLIC BLOOD PRESSURE: 64 MMHG | SYSTOLIC BLOOD PRESSURE: 114 MMHG | HEART RATE: 62 BPM | WEIGHT: 133 LBS | BODY MASS INDEX: 22.71 KG/M2

## 2019-01-10 DIAGNOSIS — N95.1 SYMPTOMATIC MENOPAUSAL OR FEMALE CLIMACTERIC STATES: ICD-10-CM

## 2019-01-10 DIAGNOSIS — Z01.419 ENCOUNTER FOR GYNECOLOGICAL EXAMINATION WITHOUT ABNORMAL FINDING: Primary | ICD-10-CM

## 2019-01-10 DIAGNOSIS — E55.9 VITAMIN D DEFICIENCY: ICD-10-CM

## 2019-01-10 DIAGNOSIS — Z13.1 SCREENING FOR DIABETES MELLITUS: ICD-10-CM

## 2019-01-10 DIAGNOSIS — F51.01 PRIMARY INSOMNIA: ICD-10-CM

## 2019-01-10 DIAGNOSIS — E78.01 FAMILIAL HYPERCHOLESTEROLEMIA: ICD-10-CM

## 2019-01-10 DIAGNOSIS — E53.8 VITAMIN B12 DEFICIENCY: ICD-10-CM

## 2019-01-10 PROCEDURE — 88175 CYTOPATH C/V AUTO FLUID REDO: CPT | Performed by: NURSE PRACTITIONER

## 2019-01-10 PROCEDURE — 87624 HPV HI-RISK TYP POOLED RSLT: CPT | Performed by: NURSE PRACTITIONER

## 2019-01-10 PROCEDURE — 99396 PREV VISIT EST AGE 40-64: CPT | Performed by: NURSE PRACTITIONER

## 2019-01-10 PROCEDURE — 88141 CYTOPATH C/V INTERPRET: CPT | Performed by: NURSE PRACTITIONER

## 2019-01-10 RX ORDER — ZOLPIDEM TARTRATE 5 MG/1
5 TABLET ORAL
Qty: 30 TABLET | Refills: 3 | Status: SHIPPED | OUTPATIENT
Start: 2019-01-10 | End: 2019-06-05

## 2019-01-10 RX ORDER — INFLUENZA A VIRUS A/GUANGDONG-MAONAN/SWL1536/2019 CNIC-1909 (H1N1) ANTIGEN (FORMALDEHYDE INACTIVATED), INFLUENZA A VIRUS A/HONG KONG/2671/2019 (H3N2) ANTIGEN (FORMALDEHYDE INACTIVATED), INFLUENZA B VIRUS B/PHUKET/3073/2013 ANTIGEN (FORMALDEHYDE INACTIVATED), AND INFLUENZA B VIRUS B/WASHINGTON/02/2019 ANTIGEN (FORMALDEHYDE INACTIVATED) 15; 15; 15; 15 UG/.5ML; UG/.5ML; UG/.5ML; UG/.5ML
INJECTION, SUSPENSION INTRAMUSCULAR
Refills: 0 | COMMUNITY
Start: 2018-09-13 | End: 2019-04-29

## 2019-01-10 RX ORDER — ESTRADIOL 1 MG/1
1 TABLET ORAL DAILY
Qty: 90 TABLET | Refills: 3 | Status: SHIPPED | OUTPATIENT
Start: 2019-01-10 | End: 2019-10-30

## 2019-01-10 SDOH — HEALTH STABILITY: MENTAL HEALTH: HOW OFTEN DO YOU HAVE 6 OR MORE DRINKS ON ONE OCCASION?: NEVER

## 2019-01-10 SDOH — HEALTH STABILITY: MENTAL HEALTH: HOW OFTEN DO YOU HAVE A DRINK CONTAINING ALCOHOL?: 2-4 TIMES A MONTH

## 2019-01-10 ASSESSMENT — ANXIETY QUESTIONNAIRES
2. NOT BEING ABLE TO STOP OR CONTROL WORRYING: NOT AT ALL
6. BECOMING EASILY ANNOYED OR IRRITABLE: NOT AT ALL
1. FEELING NERVOUS, ANXIOUS, OR ON EDGE: NOT AT ALL
5. BEING SO RESTLESS THAT IT IS HARD TO SIT STILL: NOT AT ALL
GAD7 TOTAL SCORE: 0
IF YOU CHECKED OFF ANY PROBLEMS ON THIS QUESTIONNAIRE, HOW DIFFICULT HAVE THESE PROBLEMS MADE IT FOR YOU TO DO YOUR WORK, TAKE CARE OF THINGS AT HOME, OR GET ALONG WITH OTHER PEOPLE: NOT DIFFICULT AT ALL
7. FEELING AFRAID AS IF SOMETHING AWFUL MIGHT HAPPEN: NOT AT ALL
3. WORRYING TOO MUCH ABOUT DIFFERENT THINGS: NOT AT ALL

## 2019-01-10 ASSESSMENT — MIFFLIN-ST. JEOR: SCORE: 1193.28

## 2019-01-10 ASSESSMENT — PATIENT HEALTH QUESTIONNAIRE - PHQ9
5. POOR APPETITE OR OVEREATING: NOT AT ALL
SUM OF ALL RESPONSES TO PHQ QUESTIONS 1-9: 3

## 2019-01-10 NOTE — PROGRESS NOTES
Katherine is a 53 year old  female who presents for annual exam.     Besides routine health maintenance, she has no other health concerns today .    HPI:  The patient's PCP is JUAN DODGE.  Pt here today for her annual exam. She is due for her mammogram in the spring.    She is feeling well. No vaginal bleeding. Minimal hot flashes. Taking 1mg oral E2. No headaches/migraines.     Aging parents are stressing her. Work is her outlet. Love her job.     Needs refills today.     Hx of abnormal vaginal pap. Will update today.       GYNECOLOGIC HISTORY:    No LMP recorded. Patient has had a hysterectomy.  Her current contraception method is: hysterectomy.  She  reports that she has quit smoking. Her smoking use included cigarettes. She has a 1.50 pack-year smoking history. she has never used smokeless tobacco.    Patient is sexually active.  STD testing offered?  Declined  Last PHQ-9 score on record =   PHQ-9 SCORE 1/10/2019   PHQ-9 Total Score 3     Last GAD7 score on record =   PHILOMENA-7 SCORE 1/10/2019   Total Score 0     Alcohol Score = 2    HEALTH MAINTENANCE:  Cholesterol:11/4/15  Total= 261, Triglycerides=96, HDL=83, MJO=953    Cholesterol   Date Value Ref Range Status   2015 261 (H) <200 mg/dL Final     Comment:     LDL Cholesterol is the primary guide to therapy.   The NCEP recommends further evaluation of: patients with cholesterol greater   than 200 mg/dL if additional risk factors are present, cholesterol greater   than   240 mg/dL, triglycerides greater than 150 mg/dL, or HDL less than 40 mg/dL.     10/24/2012 204 (H) 0 - 200 mg/dL Final     Comment:     LDL Cholesterol is the primary guide to therapy.   The NCEP recommends further evaluation of: patients with cholesterol greater   than 200 mg/dL if additional risk factors are present, cholesterol greater   than   240 mg/dL, triglycerides greater than 150 mg/dL, or HDL less than 40 mg/dL.      Last Mammo: 18, Result: normal, Next Mammo:  .   Pap:   Lab Results   Component Value Date    PAP NIL HPV NEG  2018    PAP ASC-US 2017    PAP LSIL 10/09/2017      Colonoscopy:  16, Result: normal, Next Colonoscopy: .  Dexa:  NA    Health maintenance updated:  yes    HISTORY:  Obstetric History       T3      L3     SAB0   TAB0   Ectopic0   Multiple0   Live Births0       # Outcome Date GA Lbr Cj/2nd Weight Sex Delivery Anes PTL Lv   3 Term            2 Term            1 Term                   Patient Active Problem List   Diagnosis     CARDIOVASCULAR SCREENING; LDL GOAL LESS THAN 130     Migraine headache     GERD (gastroesophageal reflux disease)     Phlebitis     MEDIAL MENISCUS TEAR - left     Colitis     Familial hyperlipoproteinemia     Paresthesias, hands & feet     Shoulder impingement - bilateral     Cervical radiculopathy     Vertiginous migraine, acephalgic migraine episodes also     History of hysterectomy including cervix     Past Surgical History:   Procedure Laterality Date     AS ABLATION, ENDOMETRIAL, THERMAL, W/O HYSTEROSCOPIC GUIDANCE      Her Option Endo Ablation     CL AFF SURGICAL PATHOLOGY       COLONOSCOPY WITH CO2 INSUFFLATION N/A 2016    Procedure: COLONOSCOPY WITH CO2 INSUFFLATION;  Surgeon: Ana Romo MD;  Location: [facility] OR     COMBINED ESOPHAGOSCOPY, GASTROSCOPY, DUODENOSCOPY (EGD) WITH CO2 INSUFFLATION N/A 2016    Procedure: COMBINED ESOPHAGOSCOPY, GASTROSCOPY, DUODENOSCOPY (EGD) WITH CO2 INSUFFLATION;  Surgeon: Ana Romo MD;  Location: [facility] OR     ENDOSCOPIC LIGATION VEIN(S)      Right     ESOPHAGOSCOPY, GASTROSCOPY, DUODENOSCOPY (EGD), COMBINED N/A 2016    Procedure: COMBINED ESOPHAGOSCOPY, GASTROSCOPY, DUODENOSCOPY (EGD), BIOPSY SINGLE OR MULTIPLE;  Surgeon: Ana Romo MD;  Location: [facility] OR     HC DILATION/CURETTAGE DIAG/THER NON OB       HC KNEE SCOPE,SINGLE MENISECTOMY  7/15/11    Left - LATERAL AND MEDIAL  MENISECTOMIES - NOT SINGLE!!!     HC REMOVAL OF TONSILS,12+ Y/O  30 years ago     HYSTERECTOMY VAGINAL  04/11/2006    cervix removed     LASER CO2 VAGINA  2012    at [facility]     NECK SURGERY  2014    cervical fusion     TUBAL LIGATION  1993      Social History     Tobacco Use     Smoking status: Former Smoker     Packs/day: 0.50     Years: 3.00     Pack years: 1.50     Types: Cigarettes     Smokeless tobacco: Never Used   Substance Use Topics     Alcohol use: Yes     Alcohol/week: 1.2 - 1.8 oz     Types: 2 - 3 Standard drinks or equivalent per week     Frequency: 2-4 times a month     Binge frequency: Never     Comment: 2-4 drinks/week      Problem (# of Occurrences) Relation (Name,Age of Onset)    Alcohol/Drug (1) Maternal Grandmother    Allergies (1) Daughter    Arthritis (2) Mother, Father    Asthma (2) Son (Santana), Daughter    Cancer (1) Maternal Grandmother: Bone cancer    Cerebrovascular Disease (2) Father, Paternal Grandfather    Gastrointestinal Disease (1) Mother: Kidney    Heart Disease (2) Paternal Grandmother, Son (Santana)    Hypertension (1) Mother    Lipids (1) Father    Neurologic Disorder (3) Paternal Grandmother: migraines, Daughter: migraines, Son (Skyler): migraines       Negative family history of: Blood Disease, Anesthesia Reaction            Current Outpatient Medications   Medication Sig     amitriptyline (ELAVIL) 25 MG tablet Take 1 tablet (25 mg) by mouth nightly as needed for sleep     aspirin 81 MG tablet Take 1 tablet by mouth daily.     Cholecalciferol (VITAMIN D3 PO) Take by mouth daily     Cyanocobalamin (VITAMIN B12 PO)      Cyclobenzaprine HCl (FLEXERIL PO)      estradiol (ESTRACE) 1 MG tablet Take 1 tablet (1 mg) by mouth daily     FLUZONE QUADRIVALENT injection      omeprazole (PRILOSEC) 20 MG capsule Take 1 capsule by mouth daily.     simvastatin (ZOCOR) 40 MG tablet Take 1 tablet (40 mg) by mouth At Bedtime     vitamin (B COMPLEX) tablet Take 1 tablet by mouth     zolpidem  "(AMBIEN) 5 MG tablet Take 1 tablet (5 mg) by mouth nightly as needed     No current facility-administered medications for this visit.      Allergies   Allergen Reactions     Ciprofloxacin      Verapamil Unknown       Past medical, surgical, social and family histories were reviewed and updated in EPIC.    ROS:   12 point review of systems negative other than symptoms noted below.  Gastrointestinal: Heartburn  Genitourinary: Hot Flashes  Musculoskeletal: Joint Pain  Psychiatric: Difficulty Sleeping    EXAM:  /64   Pulse 62   Ht 1.626 m (5' 4\")   Wt 60.3 kg (133 lb)   BMI 22.83 kg/m     BMI: Body mass index is 22.83 kg/m .    PHYSICAL EXAM:5 4  Constitutional:  Appearance: Well nourished, well developed, alert, in no acute distress  Neck:  Lymph Nodes:  No lymphadenopathy present    Thyroid:  Gland size normal, nontender, no nodules or masses present  on palpation  Chest:  Respiratory Effort:  Breathing unlabored  Cardiovascular:    Heart: Auscultation:  Regular rate, normal rhythm, no murmurs present  Breasts: Inspection of Breasts:  No lymphadenopathy present., Palpation of Breasts and Axillae:  No masses present on palpation, no breast tenderness., Axillary Lymph Nodes:  No lymphadenopathy present. and No nodularity, asymmetry or nipple discharge bilaterally.  Gastrointestinal:   Abdominal Examination:  Abdomen nontender to palpation, tone normal without rigidity or guarding, no masses present, umbilicus without lesions   Liver and Spleen:  No hepatomegaly present, liver nontender to palpation    Hernias:  No hernias present  Lymphatic: Lymph Nodes:  No other lymphadenopathy present  Skin:  General Inspection:  No rashes present, no lesions present, no areas of  discoloration    Genitalia and Groin:  No rashes present, no lesions present, no areas of  discoloration, no masses present  Neurologic/Psychiatric:    Mental Status:  Oriented X3     Pelvic Exam:  External Genitalia:     Normal appearance for " age, no discharge present, no tenderness present, no inflammatory lesions present, color normal  Vagina:     Normal vaginal vault without central or paravaginal defects, no discharge present, no inflammatory lesions present, no masses present  Bladder:     Nontender to palpation  Urethra:   Urethral Body:  Urethra palpation normal, urethra structural support normal   Urethral Meatus:  No erythema or lesions present  Cervix:     Surgically absent  Uterus:     Surgically absent  Adnexa:     No adnexal tenderness present, no adnexal masses present  Perineum:     Perineum within normal limits, no evidence of trauma, no rashes or skin lesions present  Anus:     Anus within normal limits, no hemorrhoids present  Inguinal Lymph Nodes:     No lymphadenopathy present  Pubic Hair:     Normal pubic hair distribution for age  Genitalia and Groin:     No rashes present, no lesions present, no areas of discoloration, no masses present      COUNSELING:   Special attention given to:        Regular exercise       Healthy diet/nutrition       Osteoporosis Prevention/Bone Health       Colon cancer screening       (Lu)menopause management    BMI: Body mass index is 22.83 kg/m .      ASSESSMENT:  53 year old female with satisfactory annual exam.    ICD-10-CM    1. Encounter for gynecological examination without abnormal finding Z01.419 Pap imaged thin layer screen with HPV - recommended age 30 - 65     HPV High Risk Types DNA Cervical   2. Primary insomnia F51.01 zolpidem (AMBIEN) 5 MG tablet   3. Symptomatic menopausal or female climacteric states N95.1 estradiol (ESTRACE) 1 MG tablet   4. Familial hypercholesterolemia E78.01 Lipid panel reflex to direct LDL Fasting   5. Screening for diabetes mellitus Z13.1 Glucose, whole blood   6. Vitamin B12 deficiency E53.8 Vitamin B12   7. Vitamin D deficiency E55.9 **Vitamin D Deficiency FUTURE anytime       PLAN:  Normal gyn exam. Pap pending. meds refilled. Needs fasting labs-in future.  Needs DEXA next year.     MIGUEL Macias CNP

## 2019-01-11 ASSESSMENT — ANXIETY QUESTIONNAIRES: GAD7 TOTAL SCORE: 0

## 2019-01-15 LAB
COPATH REPORT: ABNORMAL
PAP: ABNORMAL

## 2019-01-23 DIAGNOSIS — Z13.1 SCREENING FOR DIABETES MELLITUS: ICD-10-CM

## 2019-01-23 DIAGNOSIS — E53.8 VITAMIN B12 DEFICIENCY: ICD-10-CM

## 2019-01-23 DIAGNOSIS — E55.9 VITAMIN D DEFICIENCY: ICD-10-CM

## 2019-01-23 DIAGNOSIS — E78.01 FAMILIAL HYPERCHOLESTEROLEMIA: ICD-10-CM

## 2019-01-23 LAB
CHOLEST SERPL-MCNC: 169 MG/DL
DEPRECATED CALCIDIOL+CALCIFEROL SERPL-MC: 79 UG/L (ref 20–75)
GLUCOSE SERPL-MCNC: 80 MG/DL (ref 70–99)
HDLC SERPL-MCNC: 70 MG/DL
LDLC SERPL CALC-MCNC: 85 MG/DL
NONHDLC SERPL-MCNC: 99 MG/DL
TRIGL SERPL-MCNC: 71 MG/DL
VIT B12 SERPL-MCNC: 1051 PG/ML (ref 193–986)

## 2019-01-23 PROCEDURE — 36415 COLL VENOUS BLD VENIPUNCTURE: CPT | Performed by: NURSE PRACTITIONER

## 2019-01-23 PROCEDURE — 82306 VITAMIN D 25 HYDROXY: CPT | Performed by: NURSE PRACTITIONER

## 2019-01-23 PROCEDURE — 82607 VITAMIN B-12: CPT | Performed by: NURSE PRACTITIONER

## 2019-01-23 PROCEDURE — 82947 ASSAY GLUCOSE BLOOD QUANT: CPT | Performed by: NURSE PRACTITIONER

## 2019-01-23 PROCEDURE — 80061 LIPID PANEL: CPT | Performed by: NURSE PRACTITIONER

## 2019-03-20 ENCOUNTER — OFFICE VISIT (OUTPATIENT)
Dept: OBGYN | Facility: CLINIC | Age: 54
End: 2019-03-20
Payer: COMMERCIAL

## 2019-03-20 VITALS
HEART RATE: 68 BPM | HEIGHT: 64 IN | BODY MASS INDEX: 22.36 KG/M2 | SYSTOLIC BLOOD PRESSURE: 116 MMHG | DIASTOLIC BLOOD PRESSURE: 74 MMHG | WEIGHT: 131 LBS

## 2019-03-20 DIAGNOSIS — R87.622 PAP SMEAR ABNORMALITY OF VAGINA WITH LGSIL: Primary | ICD-10-CM

## 2019-03-20 PROCEDURE — 99212 OFFICE O/P EST SF 10 MIN: CPT | Performed by: NURSE PRACTITIONER

## 2019-03-20 PROCEDURE — 88175 CYTOPATH C/V AUTO FLUID REDO: CPT | Performed by: NURSE PRACTITIONER

## 2019-03-20 PROCEDURE — 87624 HPV HI-RISK TYP POOLED RSLT: CPT | Performed by: NURSE PRACTITIONER

## 2019-03-20 PROCEDURE — 88141 CYTOPATH C/V INTERPRET: CPT | Performed by: NURSE PRACTITIONER

## 2019-03-20 ASSESSMENT — MIFFLIN-ST. JEOR: SCORE: 1184.21

## 2019-03-20 NOTE — PROGRESS NOTES
SUBJECTIVE:                                                   Katherine Tipton is a 53 year old female who presents to clinic today for the following health issue(s):  Patient presents with:  Repeat Pap Smear      HPI:  Pt here today for repeat pap smear following a LSIL neg HPV in January.     S/p LAVH. No complaints today.     Will be starting a new job in April, would like to have colpo done ASAP if needed before starting new job.     No LMP recorded. Patient has had a hysterectomy..   Patient is sexually active, .  Using hysterectomy for contraception.    reports that she has quit smoking. Her smoking use included cigarettes. She has a 1.50 pack-year smoking history. she has never used smokeless tobacco.    STD testing offered?  Declined    Health maintenance updated:  yes    Today's PHQ-2 Score:   PHQ-2 (  [employer]) 3/20/2019   Q1: Little interest or pleasure in doing things 0   Q2: Feeling down, depressed or hopeless 0   PHQ-2 Score 0     Today's PHQ-9 Score:   PHQ-9 SCORE 1/10/2019   PHQ-9 Total Score 3     Today's PHILOMENA-7 Score:   PHILOMENA-7 SCORE 1/10/2019   Total Score 0       Problem list and histories reviewed & adjusted, as indicated.  Additional history: as documented.    Patient Active Problem List   Diagnosis     CARDIOVASCULAR SCREENING; LDL GOAL LESS THAN 130     Migraine headache     GERD (gastroesophageal reflux disease)     Phlebitis     MEDIAL MENISCUS TEAR - left     Colitis     Familial hyperlipoproteinemia     Paresthesias, hands & feet     Shoulder impingement - bilateral     Cervical radiculopathy     Vertiginous migraine, acephalgic migraine episodes also     History of hysterectomy including cervix     Past Surgical History:   Procedure Laterality Date     AS ABLATION, ENDOMETRIAL, THERMAL, W/O HYSTEROSCOPIC GUIDANCE      Her Option Endo Ablation     CL AFF SURGICAL PATHOLOGY       COLONOSCOPY WITH CO2 INSUFFLATION N/A 2016    Procedure: COLONOSCOPY WITH CO2 INSUFFLATION;   Surgeon: Ana Romo MD;  Location: [facility]     COMBINED ESOPHAGOSCOPY, GASTROSCOPY, DUODENOSCOPY (EGD) WITH CO2 INSUFFLATION N/A 7/26/2016    Procedure: COMBINED ESOPHAGOSCOPY, GASTROSCOPY, DUODENOSCOPY (EGD) WITH CO2 INSUFFLATION;  Surgeon: Ana Romo MD;  Location: [facility]     ENDOSCOPIC LIGATION VEIN(S)  1995    Right     ESOPHAGOSCOPY, GASTROSCOPY, DUODENOSCOPY (EGD), COMBINED N/A 7/26/2016    Procedure: COMBINED ESOPHAGOSCOPY, GASTROSCOPY, DUODENOSCOPY (EGD), BIOPSY SINGLE OR MULTIPLE;  Surgeon: Ana Romo MD;  Location: [facility]     HC DILATION/CURETTAGE DIAG/THER NON OB  2002     HC KNEE SCOPE,SINGLE MENISECTOMY  7/15/11    Left - LATERAL AND MEDIAL MENISECTOMIES - NOT SINGLE!!!     HC REMOVAL OF TONSILS,12+ Y/O  30 years ago     HYSTERECTOMY VAGINAL  04/11/2006    cervix removed     LASER CO2 VAGINA  2012    at [facility]      NECK SURGERY  2014    cervical fusion     TUBAL LIGATION  1993      Social History     Tobacco Use     Smoking status: Former Smoker     Packs/day: 0.50     Years: 3.00     Pack years: 1.50     Types: Cigarettes     Smokeless tobacco: Never Used   Substance Use Topics     Alcohol use: Yes     Alcohol/week: 1.2 - 1.8 oz     Types: 2 - 3 Standard drinks or equivalent per week     Frequency: 2-4 times a month     Binge frequency: Never     Comment: 2-4 drinks/week      Problem (# of Occurrences) Relation (Name,Age of Onset)    Alcohol/Drug (1) Maternal Grandmother    Allergies (1) Daughter    Arthritis (2) Mother, Father    Asthma (2) Son (Santana), Daughter    Cancer (1) Maternal Grandmother: Bone cancer    Cerebrovascular Disease (2) Father, Paternal Grandfather    Gastrointestinal Disease (1) Mother: Kidney    Heart Disease (2) Paternal Grandmother, Son (Santana)    Hypertension (1) Mother    Lipids (1) Father    Neurologic Disorder (3) Paternal Grandmother: migraines, Daughter: migraines, Son (Skyler): migraines       Negative family  "history of: Blood Disease, Anesthesia Reaction            Current Outpatient Medications   Medication Sig     aspirin 81 MG tablet Take 1 tablet by mouth daily.     Cholecalciferol (VITAMIN D3 PO) Take by mouth daily     Cyclobenzaprine HCl (FLEXERIL PO)      estradiol (ESTRACE) 1 MG tablet Take 1 tablet (1 mg) by mouth daily     FLUZONE QUADRIVALENT injection      omeprazole (PRILOSEC) 20 MG capsule Take 1 capsule by mouth daily.     simvastatin (ZOCOR) 40 MG tablet Take 1 tablet (40 mg) by mouth At Bedtime     zolpidem (AMBIEN) 5 MG tablet Take 1 tablet (5 mg) by mouth nightly as needed     No current facility-administered medications for this visit.      Allergies   Allergen Reactions     Ciprofloxacin      Verapamil Unknown       ROS:  12 point review of systems negative other than symptoms noted below.  Gastrointestinal: Heartburn  Psychiatric: Difficulty Sleeping    OBJECTIVE:     /74   Pulse 68   Ht 1.626 m (5' 4\")   Wt 59.4 kg (131 lb)   BMI 22.49 kg/m    Body mass index is 22.49 kg/m .    Exam:  Constitutional:  Appearance: Well nourished, well developed alert, in no acute distress  Neurologic/Psychiatric:  Mental Status:  Oriented X3   Pelvic Exam:  External Genitalia:     Normal appearance for age, no discharge present, no tenderness present, no inflammatory lesions present, color normal  Vagina:     Normal vaginal vault without central or paravaginal defects, no discharge present, no inflammatory lesions present, no masses present  Bladder:     Nontender to palpation  Urethra:   Urethral Body:  Urethra palpation normal, urethra structural support normal   Urethral Meatus:  No erythema or lesions present  Cervix:     Surgically absent  Uterus:     Surgically absent  Adnexa:     No adnexal tenderness present, no adnexal masses present  Perineum:     Perineum within normal limits, no evidence of trauma, no rashes or skin lesions present  Anus:     Anus within normal limits, no hemorrhoids " present  Inguinal Lymph Nodes:     No lymphadenopathy present  Pubic Hair:     Normal pubic hair distribution for age  Genitalia and Groin:     No rashes present, no lesions present, no areas of discoloration, no masses present       In-Clinic Test Results:  No results found for this or any previous visit (from the past 24 hour(s)).    ASSESSMENT/PLAN:                                                        ICD-10-CM    1. 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    Normal gyn exam. Will update with pap. If LSIL +/- HPV will do vag colpo. Pt agrees with plan.     MIGUEL Macias CNP  [facility]

## 2019-03-26 LAB
COPATH REPORT: ABNORMAL
PAP: ABNORMAL

## 2019-04-29 ENCOUNTER — OFFICE VISIT (OUTPATIENT)
Dept: OBGYN | Facility: CLINIC | Age: 54
End: 2019-04-29
Payer: COMMERCIAL

## 2019-04-29 VITALS
DIASTOLIC BLOOD PRESSURE: 74 MMHG | WEIGHT: 130 LBS | HEART RATE: 76 BPM | SYSTOLIC BLOOD PRESSURE: 118 MMHG | HEIGHT: 64 IN | BODY MASS INDEX: 22.2 KG/M2

## 2019-04-29 DIAGNOSIS — R87.622 PAP SMEAR ABNORMALITY OF VAGINA WITH LGSIL: Primary | ICD-10-CM

## 2019-04-29 PROCEDURE — 87624 HPV HI-RISK TYP POOLED RSLT: CPT | Performed by: OBSTETRICS & GYNECOLOGY

## 2019-04-29 PROCEDURE — 57452 EXAM OF CERVIX W/SCOPE: CPT | Performed by: OBSTETRICS & GYNECOLOGY

## 2019-04-29 PROCEDURE — 88175 CYTOPATH C/V AUTO FLUID REDO: CPT | Performed by: OBSTETRICS & GYNECOLOGY

## 2019-04-29 ASSESSMENT — MIFFLIN-ST. JEOR: SCORE: 1174.68

## 2019-04-29 NOTE — LETTER
May 2, 2019      Katherine Tipton  [id number]  [facility] [address]    Dear ,      This letter is in regards to the PAP smear and HPV (Human Papillomavirus) test you had done recently during your visually normal colposcopy.  Your PAP test result is normal, but your HPV (Human Papillomavirus) test was positive.     About 80 percent of women have been exposed to HPV virus throughout their lifetime. There is no medication for the treatment of HPV. Typically your own immune system gets rid of the virus before it does harm. HPV is spread by direct skin-to-skin contact, including sexual intercourse, oral sex, anal sex, or any other contact involving the genital area (example: hand to genital contact). It is not possible to become infected with HPV by touching an object, such as a toilet seat. Most people who are infected with HPV have no signs or symptoms.    Things that you can do to boost your immune system and help your body get rid of HPV: get plenty of rest, eat a well-balanced diet of healthy foods, stop smoking, exercise regularly and decrease stress.    Please return in 6 months to repeat your pap smear and HPV test.     If you have additional questions regarding this result, please call our registered nurse, Destiny at [phone].    Sincerely,      MD william Chu

## 2019-04-29 NOTE — PROGRESS NOTES
INDICATIONS:                                                    Is a pregnancy test required: No.  Was a consent obtained?  Yes    Today's PHQ-2 Score:   PHQ-2 ( 1999 [employer]) 3/20/2019   Q1: Little interest or pleasure in doing things 0   Q2: Feeling down, depressed or hopeless 0   PHQ-2 Score 0     Today's PHQ-9 Score:    PHQ-9 SCORE 1/10/2019   PHQ-9 Total Score 3       Katherine Tipton, is a 54 year old female, who had a recent LGSIL pap.  HPV negative.  Yes prior history of abnormal pap. Here today for colposcopy. Discussed indication, risks of infection and bleeding.    Her last pap was   Lab Results   Component Value Date    PAP LSIL 03/20/2019    .    PROCEDURE:                                                      Vagina is stained with acetic acid and viewed colposcopically. Vaginal apex is  visualized in it's entirety.  There were no atypical lesions identified and no biopsies taken.         POST PROCEDURE:                                                      IMPRESSION: Patient with long-standing history of dysplasia with no current lesions identified.    PLAN : Await the results of the biopsies.  Repeat pap in 6 months.  She  tolerated the procedure well. There were no complications. Patient was discharged in stable condition.    Patient advised to call the clinic if excessive bleeding, pelvic pain, or fever.     Follow-up plan based on pathology results.    Cayetano Sal MD

## 2019-05-01 LAB
COPATH REPORT: NORMAL
PAP: NORMAL

## 2019-05-02 LAB
FINAL DIAGNOSIS: ABNORMAL
HPV HR 12 DNA CVX QL NAA+PROBE: POSITIVE
HPV16 DNA SPEC QL NAA+PROBE: NEGATIVE
HPV18 DNA SPEC QL NAA+PROBE: NEGATIVE
SPECIMEN DESCRIPTION: ABNORMAL
SPECIMEN SOURCE CVX/VAG CYTO: ABNORMAL

## 2019-06-14 DIAGNOSIS — F51.01 PRIMARY INSOMNIA: ICD-10-CM

## 2019-06-14 RX ORDER — ZOLPIDEM TARTRATE 5 MG/1
TABLET ORAL
Qty: 30 TABLET | Refills: 2 | OUTPATIENT
Start: 2019-06-14

## 2019-06-14 NOTE — TELEPHONE ENCOUNTER
Requested Prescriptions   Pending Prescriptions Disp Refills     zolpidem (AMBIEN) 5 MG tablet [Pharmacy Med Name: Zolpidem Tartrate Oral Tablet 5 MG] 30 tablet 2     Sig: TAKE 1 TABLET BY MOUTH NIGHTLY AS NEEDED       There is no refill protocol information for this order        Routing to covering provider  Dilcia Velez RN on 6/14/2019 at 2:54 PM

## 2019-09-23 ENCOUNTER — TELEPHONE (OUTPATIENT)
Dept: OBGYN | Facility: CLINIC | Age: 54
End: 2019-09-23

## 2019-09-23 DIAGNOSIS — F51.01 PRIMARY INSOMNIA: ICD-10-CM

## 2019-09-23 RX ORDER — ZOLPIDEM TARTRATE 5 MG/1
TABLET ORAL
Qty: 30 TABLET | Refills: 2 | Status: SHIPPED | OUTPATIENT
Start: 2019-09-23 | End: 2019-10-30

## 2019-09-23 NOTE — TELEPHONE ENCOUNTER
Refill request for Ambien and Estrace    [facility]

## 2019-09-23 NOTE — TELEPHONE ENCOUNTER
estradiol (ESTRACE) 1 MG tablet   Last Written Prescription Date:  1/10/19  Last Fill Quantity: 90,   # refills: 3  Last Office Visit with [facility] primary care provider:  1/10/19 Annual  Future Office visit: none    Routing refill request to provider for review/approval because:  Called pharmacy and pt has two refills left on her Estradiol.     zolpidem (AMBIEN) 5 MG tablet   Last Written Prescription Date:  6/5/19  Last Fill Quantity: 30,   # refills: 2  Last Office Visit with [facility] primary care provider:  1/10/19 Annual  Future Office visit: none    Routing refill request to provider for review/approval because:  Drug not on the [facility], [facility] or [facility] refill protocol or controlled substance. Routing to Maria Esther Canada

## 2019-10-29 NOTE — PROGRESS NOTES
Katherine is a 54 year old  female who presents for annual exam.     Besides routine health maintenance, she has no other health concerns today .    HPI:  The patient's PCP is JUAN DODGE.  Pt here today for her annual gyn exam mammogram and flu shot. She is feeling okay except she was pulled down a flight of stairs by her puppy 2 days ago. She is sore, but getting better.     Hx LSIL will repeat pap. LAVH in past. No bleeding.     Needs fasting labs and vitamins checked. Will do fasting.       GYNECOLOGIC HISTORY:    No LMP recorded. Patient has had a hysterectomy.    Her current contraception method is: hysterectomy.  She  reports that she has quit smoking. Her smoking use included cigarettes. She has a 1.50 pack-year smoking history. She has never used smokeless tobacco.    Patient is sexually active.  STD testing offered?  Declined  Last PHQ-9 score on record =   PHQ-9 SCORE 10/30/2019   PHQ-9 Total Score 0     Last GAD7 score on record =   PHILOMENA-7 SCORE 10/30/2019   Total Score 0     Alcohol Score = 0    HEALTH MAINTENANCE:  Cholesterol: 19   Total= 169, Triglycerides=71, HDL=70, LDL=85, FBS=80 -patient is not fasting but would like future labs ordered  Last Mammo: 18, Result: Normal, Next Mammo: Today   Pap: 19 neg, HPV HR OTHER POSITIVE  Pap: 1/10/19 LSIL, hpv -  Pap: 18 neg, hpv-  Pap: 17 ASCUS, HPV HR OTHER POSITIVE  Colonoscopy: 16, Result: Normal, Next Colonoscopy: 2 years.  Dexa: Never    Health maintenance updated:  yes    HISTORY:  OB History    Para Term  AB Living   3 3 3 0 0 3   SAB TAB Ectopic Multiple Live Births   0 0 0 0 0      # Outcome Date GA Lbr Cj/2nd Weight Sex Delivery Anes PTL Lv   3 Term            2 Term            1 Term                Patient Active Problem List   Diagnosis     CARDIOVASCULAR SCREENING; LDL GOAL LESS THAN 130     Migraine headache     GERD (gastroesophageal reflux disease)     Phlebitis     MEDIAL MENISCUS  TEAR - left     Colitis     Familial hyperlipoproteinemia     Paresthesias, hands & feet     Shoulder impingement - bilateral     Cervical radiculopathy     Vertiginous migraine, acephalgic migraine episodes also     History of hysterectomy including cervix     Past Surgical History:   Procedure Laterality Date     AS ABLATION, ENDOMETRIAL, THERMAL, W/O HYSTEROSCOPIC GUIDANCE  2005    Her Option Endo Ablation     CL AFF SURGICAL PATHOLOGY       COLONOSCOPY WITH CO2 INSUFFLATION N/A 7/26/2016    Procedure: COLONOSCOPY WITH CO2 INSUFFLATION;  Surgeon: Ana Romo MD;  Location: [facility]     COMBINED ESOPHAGOSCOPY, GASTROSCOPY, DUODENOSCOPY (EGD) WITH CO2 INSUFFLATION N/A 7/26/2016    Procedure: COMBINED ESOPHAGOSCOPY, GASTROSCOPY, DUODENOSCOPY (EGD) WITH CO2 INSUFFLATION;  Surgeon: Ana Romo MD;  Location: [facility]     ENDOSCOPIC LIGATION VEIN(S)  1995    Right     ESOPHAGOSCOPY, GASTROSCOPY, DUODENOSCOPY (EGD), COMBINED N/A 7/26/2016    Procedure: COMBINED ESOPHAGOSCOPY, GASTROSCOPY, DUODENOSCOPY (EGD), BIOPSY SINGLE OR MULTIPLE;  Surgeon: Ana Romo MD;  Location: [facility]     HC DILATION/CURETTAGE DIAG/THER NON OB  2002     HC KNEE SCOPE,SINGLE MENISECTOMY  7/15/11    Left - LATERAL AND MEDIAL MENISECTOMIES - NOT SINGLE!!!     HC REMOVAL OF TONSILS,12+ Y/O  30 years ago     HYSTERECTOMY VAGINAL  04/11/2006    cervix removed     LASER CO2 VAGINA  2012    at [facility]     NECK SURGERY  2014    cervical fusion     TUBAL LIGATION  1993      Social History     Tobacco Use     Smoking status: Former Smoker     Packs/day: 0.50     Years: 3.00     Pack years: 1.50     Types: Cigarettes     Smokeless tobacco: Never Used   Substance Use Topics     Alcohol use: Yes     Alcohol/week: 2.0 - 3.0 standard drinks     Types: 2 - 3 Standard drinks or equivalent per week     Frequency: 2-4 times a month     Binge frequency: Never     Comment: 2-4 drinks/week      Problem (# of  "Occurrences) Relation (Name,Age of Onset)    Alcohol/Drug (1) Maternal Grandmother    Allergies (1) Daughter    Arthritis (2) Mother, Father    Asthma (2) Son (Santana), Daughter    Cancer (1) Maternal Grandmother: Bone cancer    Cerebrovascular Disease (2) Father, Paternal Grandfather    Gastrointestinal Disease (1) Mother: Kidney    Heart Disease (2) Paternal Grandmother, Son (Santana)    Hypertension (1) Mother    Lipids (1) Father    Neurologic Disorder (3) Paternal Grandmother: migraines, Daughter: migraines, Son (Skyler): migraines       Negative family history of: Blood Disease, Anesthesia Reaction            Current Outpatient Medications   Medication Sig     aspirin 81 MG tablet Take 1 tablet by mouth daily.     Cholecalciferol (VITAMIN D3 PO) Take by mouth daily     Cyclobenzaprine HCl (FLEXERIL PO)      estradiol (ESTRACE) 1 MG tablet Take 1 tablet (1 mg) by mouth daily     omeprazole (PRILOSEC) 20 MG capsule Take 1 capsule by mouth daily.     simvastatin (ZOCOR) 40 MG tablet Take 1 tablet (40 mg) by mouth At Bedtime     zolpidem (AMBIEN) 5 MG tablet TAKE 1 TABLET BY MOUTH NIGHTLY AS NEEDED     No current facility-administered medications for this visit.      Allergies   Allergen Reactions     Ciprofloxacin      Verapamil Unknown       Past medical, surgical, social and family histories were reviewed and updated in EPIC.    ROS:   12 point review of systems negative other than symptoms noted below.    EXAM:  /80   Pulse 86   Ht 1.626 m (5' 4\")   Wt 59.3 kg (130 lb 12.8 oz)   BMI 22.45 kg/m     BMI: Body mass index is 22.45 kg/m .    PHYSICAL EXAM:  Constitutional:   Appearance: Well nourished, well developed, alert, in no acute distress  Neck:  Lymph Nodes:  No lymphadenopathy present    Thyroid:  Gland size normal, nontender, no nodules or masses present  on palpation  Chest:  Respiratory Effort:  Breathing unlabored  Cardiovascular:    Heart: Auscultation:  Regular rate, normal rhythm, no murmurs " present  Breasts: Inspection of Breasts:  No lymphadenopathy present., Palpation of Breasts and Axillae:  No masses present on palpation, no breast tenderness., Axillary Lymph Nodes:  No lymphadenopathy present., No nodularity, asymmetry or nipple discharge bilaterally. and sore on lateral aspect of left breast from fall down stairs  Gastrointestinal:   Abdominal Examination:  Abdomen nontender to palpation, tone normal without rigidity or guarding, no masses present, umbilicus without lesions   Liver and Spleen:  No hepatomegaly present, liver nontender to palpation    Hernias:  No hernias present  Lymphatic: Lymph Nodes:  No other lymphadenopathy present  Skin:  General Inspection:  No rashes present, no lesions present, no areas of  discoloration  Neurologic:    Mental Status:  Oriented X3.  Normal strength and tone, sensory exam                grossly normal, mentation intact and speech normal.    Psychiatric:   Mentation appears normal and affect normal/bright.         Pelvic Exam:  External Genitalia:     Normal appearance for age, no discharge present, no tenderness present, no inflammatory lesions present, color normal  Vagina:     Normal vaginal vault without central or paravaginal defects, no discharge present, no inflammatory lesions present, no masses present  Bladder:     Nontender to palpation  Urethra:   Urethral Body:  Urethra palpation normal, urethra structural support normal   Urethral Meatus:  No erythema or lesions present  Cervix:     Surgically absent  Uterus:     Surgically absent  Adnexa:     No adnexal tenderness present, no adnexal masses present  Perineum:     Perineum within normal limits, no evidence of trauma, no rashes or skin lesions present  Anus:     Anus within normal limits, no hemorrhoids present  Inguinal Lymph Nodes:     No lymphadenopathy present  Pubic Hair:     Normal pubic hair distribution for age  Genitalia and Groin:     No rashes present, no lesions present, no areas  of discoloration, no masses present      COUNSELING:   Special attention given to:        Regular exercise       Healthy diet/nutrition    BMI: Body mass index is 22.45 kg/m .      ASSESSMENT:  54 year old female with satisfactory annual exam.    ICD-10-CM    1. Encounter for gynecological examination without abnormal finding Z01.419 Pap imaged thin layer screen with HPV - recommended age 30 - 65     HPV High Risk Types DNA Cervical   2. Encounter for lipid screening for cardiovascular disease Z13.220 Lipid panel reflex to direct LDL Fasting    Z13.6    3. Screening for diabetes mellitus Z13.1 Glucose, whole blood   4. Symptomatic menopausal or female climacteric states N95.1 estradiol (ESTRACE) 1 MG tablet   5. Hyperlipidemia LDL goal <100 E78.5 simvastatin (ZOCOR) 40 MG tablet   6. Primary insomnia F51.01 zolpidem (AMBIEN) 5 MG tablet   7. Vitamin D deficiency E55.9 Vitamin D Deficiency   8. Vitamin B12 deficiency E53.8 Vitamin B12   9. Encounter for long-term (current) use of other medications Z79.899 Hepatic panel (Albumin, ALT, AST, Bili, Alk Phos, TP)       PLAN:  Normal gyn exam. Vaginal pap collected. Hx of LSIL. If NIL will repeat in 1 year. Labs pending.     MIGUEL Macias CNP

## 2019-10-30 ENCOUNTER — ANCILLARY PROCEDURE (OUTPATIENT)
Dept: MAMMOGRAPHY | Facility: CLINIC | Age: 54
End: 2019-10-30
Payer: COMMERCIAL

## 2019-10-30 ENCOUNTER — OFFICE VISIT (OUTPATIENT)
Dept: OBGYN | Facility: CLINIC | Age: 54
End: 2019-10-30
Payer: COMMERCIAL

## 2019-10-30 VITALS
SYSTOLIC BLOOD PRESSURE: 118 MMHG | DIASTOLIC BLOOD PRESSURE: 80 MMHG | HEART RATE: 86 BPM | WEIGHT: 130.8 LBS | BODY MASS INDEX: 22.33 KG/M2 | HEIGHT: 64 IN

## 2019-10-30 DIAGNOSIS — Z12.31 VISIT FOR SCREENING MAMMOGRAM: ICD-10-CM

## 2019-10-30 DIAGNOSIS — Z23 NEED FOR PROPHYLACTIC VACCINATION AND INOCULATION AGAINST INFLUENZA: ICD-10-CM

## 2019-10-30 DIAGNOSIS — Z13.220 ENCOUNTER FOR LIPID SCREENING FOR CARDIOVASCULAR DISEASE: ICD-10-CM

## 2019-10-30 DIAGNOSIS — Z13.1 SCREENING FOR DIABETES MELLITUS: ICD-10-CM

## 2019-10-30 DIAGNOSIS — E78.5 HYPERLIPIDEMIA LDL GOAL <100: ICD-10-CM

## 2019-10-30 DIAGNOSIS — F51.01 PRIMARY INSOMNIA: ICD-10-CM

## 2019-10-30 DIAGNOSIS — N95.1 SYMPTOMATIC MENOPAUSAL OR FEMALE CLIMACTERIC STATES: ICD-10-CM

## 2019-10-30 DIAGNOSIS — E53.8 VITAMIN B12 DEFICIENCY: ICD-10-CM

## 2019-10-30 DIAGNOSIS — E55.9 VITAMIN D DEFICIENCY: ICD-10-CM

## 2019-10-30 DIAGNOSIS — Z79.899 ENCOUNTER FOR LONG-TERM (CURRENT) USE OF OTHER MEDICATIONS: ICD-10-CM

## 2019-10-30 DIAGNOSIS — Z13.6 ENCOUNTER FOR LIPID SCREENING FOR CARDIOVASCULAR DISEASE: ICD-10-CM

## 2019-10-30 DIAGNOSIS — Z01.419 ENCOUNTER FOR GYNECOLOGICAL EXAMINATION WITHOUT ABNORMAL FINDING: Primary | ICD-10-CM

## 2019-10-30 PROCEDURE — 77067 SCR MAMMO BI INCL CAD: CPT | Mod: TC

## 2019-10-30 PROCEDURE — 90682 RIV4 VACC RECOMBINANT DNA IM: CPT | Performed by: NURSE PRACTITIONER

## 2019-10-30 PROCEDURE — 99396 PREV VISIT EST AGE 40-64: CPT | Mod: 25 | Performed by: NURSE PRACTITIONER

## 2019-10-30 PROCEDURE — G0145 SCR C/V CYTO,THINLAYER,RESCR: HCPCS | Performed by: NURSE PRACTITIONER

## 2019-10-30 PROCEDURE — 87624 HPV HI-RISK TYP POOLED RSLT: CPT | Performed by: NURSE PRACTITIONER

## 2019-10-30 PROCEDURE — 90471 IMMUNIZATION ADMIN: CPT | Performed by: NURSE PRACTITIONER

## 2019-10-30 RX ORDER — ZOLPIDEM TARTRATE 5 MG/1
TABLET ORAL
Qty: 30 TABLET | Refills: 2 | Status: SHIPPED | OUTPATIENT
Start: 2019-10-30 | End: 2020-03-27

## 2019-10-30 RX ORDER — ESTRADIOL 1 MG/1
1 TABLET ORAL DAILY
Qty: 90 TABLET | Refills: 3 | Status: SHIPPED | OUTPATIENT
Start: 2019-10-30 | End: 2020-11-24

## 2019-10-30 RX ORDER — SIMVASTATIN 40 MG
40 TABLET ORAL AT BEDTIME
Qty: 90 TABLET | Refills: 3 | Status: SHIPPED | OUTPATIENT
Start: 2019-10-30 | End: 2020-11-24

## 2019-10-30 ASSESSMENT — ANXIETY QUESTIONNAIRES
6. BECOMING EASILY ANNOYED OR IRRITABLE: NOT AT ALL
5. BEING SO RESTLESS THAT IT IS HARD TO SIT STILL: NOT AT ALL
2. NOT BEING ABLE TO STOP OR CONTROL WORRYING: NOT AT ALL
GAD7 TOTAL SCORE: 0
1. FEELING NERVOUS, ANXIOUS, OR ON EDGE: NOT AT ALL
3. WORRYING TOO MUCH ABOUT DIFFERENT THINGS: NOT AT ALL
IF YOU CHECKED OFF ANY PROBLEMS ON THIS QUESTIONNAIRE, HOW DIFFICULT HAVE THESE PROBLEMS MADE IT FOR YOU TO DO YOUR WORK, TAKE CARE OF THINGS AT HOME, OR GET ALONG WITH OTHER PEOPLE: NOT DIFFICULT AT ALL
7. FEELING AFRAID AS IF SOMETHING AWFUL MIGHT HAPPEN: NOT AT ALL

## 2019-10-30 ASSESSMENT — PATIENT HEALTH QUESTIONNAIRE - PHQ9
SUM OF ALL RESPONSES TO PHQ QUESTIONS 1-9: 0
5. POOR APPETITE OR OVEREATING: NOT AT ALL

## 2019-10-30 ASSESSMENT — MIFFLIN-ST. JEOR: SCORE: 1178.3

## 2019-10-31 ASSESSMENT — ANXIETY QUESTIONNAIRES: GAD7 TOTAL SCORE: 0

## 2019-11-04 LAB
COPATH REPORT: NORMAL
PAP: NORMAL

## 2019-11-05 ENCOUNTER — HEALTH MAINTENANCE LETTER (OUTPATIENT)
Age: 54
End: 2019-11-05

## 2019-11-27 ENCOUNTER — TELEPHONE (OUTPATIENT)
Dept: OBGYN | Facility: CLINIC | Age: 54
End: 2019-11-27

## 2019-11-27 NOTE — TELEPHONE ENCOUNTER
Prior Authorization Retail Medication Request  CoverMyMeds Key: [id number]    Medication/Dose: zolpidem (AMBIEN) 5 MG tablet  ICD code Primary insomnia [F51.01]:  Previously Tried and Failed:  N/A  Rationale: patient is regularly followed and get this RX for many years    Insurance Name:  [employer] CHOICE  Insurance ID:  [id number]     Question set faxed to PA team.

## 2019-11-29 NOTE — TELEPHONE ENCOUNTER
Central Prior Authorization Team   Phone: [phone]      Prior Authorization Approval    Authorization Effective Date: 11/29/2019  Authorization Expiration Date: 11/28/2022  Medication: zolpidem (AMBIEN) 5 MG tablet - APPROVED  Approved Dose/Quantity: 30 FOR 30  Reference #:     Insurance Company: [employer] - Phone [phone] Fax [phone]  Expected CoPay:       CoPay Card Available:      Foundation Assistance Needed:    Which Pharmacy is filling the prescription (Not needed for infusion/clinic administered): [id number] - [facility] [address]  Pharmacy Notified: Yes  Patient Notified: Yes (**Instructed pharmacy to notify patient when script is ready to /ship.**)

## 2020-02-07 ENCOUNTER — TELEPHONE (OUTPATIENT)
Dept: OBGYN | Facility: CLINIC | Age: 55
End: 2020-02-07

## 2020-02-07 NOTE — LETTER
[address] [address]  [phone]        February 7, 2020    Katherine Tipton  [address] [address]              Dear Katherine Tipton    This is to remind you that your Lipid, and Glucose profile is due.    You may call our office at [phone] to schedule an appointment.    Please disregard this notice if you have already had your labs drawn or made an appointment.        Sincerely,        Lauren Bro RN, CNP

## 2020-03-27 DIAGNOSIS — F51.01 PRIMARY INSOMNIA: ICD-10-CM

## 2020-03-27 RX ORDER — ZOLPIDEM TARTRATE 5 MG/1
TABLET ORAL
Qty: 30 TABLET | Refills: 1 | Status: SHIPPED | OUTPATIENT
Start: 2020-03-27 | End: 2020-06-29

## 2020-03-27 NOTE — TELEPHONE ENCOUNTER
Requested Prescriptions   Pending Prescriptions Disp Refills     zolpidem (AMBIEN) 5 MG tablet [Pharmacy Med Name: Zolpidem Tartrate Oral Tablet 5 MG] 30 tablet 1     Sig: TAKE ONE TABLET BY MOUTH NIGHTLY AS NEEDED       There is no refill protocol information for this order

## 2020-07-10 ENCOUNTER — MYC MEDICAL ADVICE (OUTPATIENT)
Dept: LAB | Facility: CLINIC | Age: 55
End: 2020-07-10

## 2020-08-03 DIAGNOSIS — F51.01 PRIMARY INSOMNIA: ICD-10-CM

## 2020-08-04 RX ORDER — ZOLPIDEM TARTRATE 5 MG/1
TABLET ORAL
Qty: 30 TABLET | Refills: 0 | Status: SHIPPED | OUTPATIENT
Start: 2020-08-04 | End: 2020-09-22

## 2020-08-04 NOTE — TELEPHONE ENCOUNTER
Requested Prescriptions   Pending Prescriptions Disp Refills     zolpidem (AMBIEN) 5 MG tablet [Pharmacy Med Name: Zolpidem Tartrate Oral Tablet 5 MG] 30 tablet 0     Sig: TAKE ONE TABLET BY MOUTH AT NIGHT AS NEEDED       There is no refill protocol information for this order        Last Written Prescription Date:  06/29/2020  Last Fill Quantity: 30,  # refills: 0   Last office visit: 10/30/2019 with prescribing provider:  Lauren Bro   Future Office Visit:

## 2020-09-22 DIAGNOSIS — F51.01 PRIMARY INSOMNIA: ICD-10-CM

## 2020-09-22 RX ORDER — ZOLPIDEM TARTRATE 5 MG/1
TABLET ORAL
Qty: 30 TABLET | Refills: 0 | Status: SHIPPED | OUTPATIENT
Start: 2020-09-22 | End: 2020-11-24

## 2020-09-22 NOTE — TELEPHONE ENCOUNTER
Requested Prescriptions   Pending Prescriptions Disp Refills     zolpidem (AMBIEN) 5 MG tablet [Pharmacy Med Name: Zolpidem Tartrate Oral Tablet 5 MG] 30 tablet 0     Sig: TAKE 1 TABLET BY MOUTH AT NIGHT AS NEEDED       There is no refill protocol information for this order

## 2020-11-24 ENCOUNTER — OFFICE VISIT (OUTPATIENT)
Dept: OBGYN | Facility: CLINIC | Age: 55
End: 2020-11-24
Payer: COMMERCIAL

## 2020-11-24 VITALS
SYSTOLIC BLOOD PRESSURE: 132 MMHG | BODY MASS INDEX: 22.23 KG/M2 | DIASTOLIC BLOOD PRESSURE: 80 MMHG | HEART RATE: 82 BPM | WEIGHT: 130.2 LBS | HEIGHT: 64 IN

## 2020-11-24 DIAGNOSIS — N95.1 SYMPTOMATIC MENOPAUSAL OR FEMALE CLIMACTERIC STATES: ICD-10-CM

## 2020-11-24 DIAGNOSIS — E78.5 HYPERLIPIDEMIA LDL GOAL <100: ICD-10-CM

## 2020-11-24 DIAGNOSIS — F51.01 PRIMARY INSOMNIA: ICD-10-CM

## 2020-11-24 DIAGNOSIS — Z13.228 SCREENING FOR METABOLIC DISORDER: ICD-10-CM

## 2020-11-24 DIAGNOSIS — Z01.419 ENCOUNTER FOR GYNECOLOGICAL EXAMINATION WITHOUT ABNORMAL FINDING: Primary | ICD-10-CM

## 2020-11-24 PROCEDURE — 80061 LIPID PANEL: CPT | Performed by: NURSE PRACTITIONER

## 2020-11-24 PROCEDURE — 36415 COLL VENOUS BLD VENIPUNCTURE: CPT | Performed by: NURSE PRACTITIONER

## 2020-11-24 PROCEDURE — 99396 PREV VISIT EST AGE 40-64: CPT | Performed by: NURSE PRACTITIONER

## 2020-11-24 PROCEDURE — 88175 CYTOPATH C/V AUTO FLUID REDO: CPT | Performed by: NURSE PRACTITIONER

## 2020-11-24 PROCEDURE — 87624 HPV HI-RISK TYP POOLED RSLT: CPT | Performed by: NURSE PRACTITIONER

## 2020-11-24 PROCEDURE — 80053 COMPREHEN METABOLIC PANEL: CPT | Performed by: NURSE PRACTITIONER

## 2020-11-24 RX ORDER — SIMVASTATIN 40 MG
40 TABLET ORAL AT BEDTIME
Qty: 90 TABLET | Refills: 3 | Status: SHIPPED | OUTPATIENT
Start: 2020-11-24 | End: 2021-08-04

## 2020-11-24 RX ORDER — MELOXICAM 15 MG/1
TABLET ORAL
COMMUNITY
Start: 2020-11-04

## 2020-11-24 RX ORDER — ZOLPIDEM TARTRATE 5 MG/1
TABLET ORAL
Qty: 30 TABLET | Refills: 0 | Status: SHIPPED | OUTPATIENT
Start: 2020-11-24 | End: 2021-01-04

## 2020-11-24 RX ORDER — ESTRADIOL 1 MG/1
1 TABLET ORAL DAILY
Qty: 90 TABLET | Refills: 3 | Status: SHIPPED | OUTPATIENT
Start: 2020-11-24 | End: 2021-11-30

## 2020-11-24 ASSESSMENT — MIFFLIN-ST. JEOR: SCORE: 1162.64

## 2020-11-24 ASSESSMENT — ANXIETY QUESTIONNAIRES
1. FEELING NERVOUS, ANXIOUS, OR ON EDGE: NOT AT ALL
IF YOU CHECKED OFF ANY PROBLEMS ON THIS QUESTIONNAIRE, HOW DIFFICULT HAVE THESE PROBLEMS MADE IT FOR YOU TO DO YOUR WORK, TAKE CARE OF THINGS AT HOME, OR GET ALONG WITH OTHER PEOPLE: NOT DIFFICULT AT ALL
5. BEING SO RESTLESS THAT IT IS HARD TO SIT STILL: NOT AT ALL
7. FEELING AFRAID AS IF SOMETHING AWFUL MIGHT HAPPEN: NOT AT ALL
6. BECOMING EASILY ANNOYED OR IRRITABLE: NOT AT ALL
3. WORRYING TOO MUCH ABOUT DIFFERENT THINGS: NOT AT ALL
GAD7 TOTAL SCORE: 0
2. NOT BEING ABLE TO STOP OR CONTROL WORRYING: NOT AT ALL

## 2020-11-24 ASSESSMENT — PATIENT HEALTH QUESTIONNAIRE - PHQ9
SUM OF ALL RESPONSES TO PHQ QUESTIONS 1-9: 2
5. POOR APPETITE OR OVEREATING: NOT AT ALL

## 2020-11-24 NOTE — PROGRESS NOTES
Katherine is a 55 year old  female who presents for annual exam.     Besides routine health maintenance, she has no other health concerns today .    HPI:  The patient's PCP is JUAN DODGE. Pt here today for her annual gyn exam.   mammo in December.   Needs fasting labs.   S/p LAVH. On oral E2 tabs. She can tell when seh misses a dose of HRT.  Had her flu shot with rheumatology-new raynauds      GYNECOLOGIC HISTORY:    No LMP recorded. Patient has had a hysterectomy.    Her current contraception method is: hysterectomy.  She  reports that she has quit smoking. Her smoking use included cigarettes. She has a 1.50 pack-year smoking history. She has never used smokeless tobacco.    Patient is sexually active.  Last PHQ-9 score on record =   PHQ-9 SCORE 2020   PHQ-9 Total Score 2     Last GAD7 score on record =   PHILOMENA-7 SCORE 2020   Total Score 0     Alcohol Score = 3    HEALTH MAINTENANCE:  Cholesterol:   Recent Labs   Lab Test 19  0813 11/04/15  0834 10/24/12  0729   CHOL 169 261* 204*   HDL 70 83 92   LDL 85 159* 99   TRIG 71 96 66   CHOLHDLRATIO  --  3.1 2.2     Last Mammo: One year ago, Result: Normal, Next Mammo: Scheduled for December  Pap:   Lab Results   Component Value Date    PAP NIL, HPV- 10/30/2019    PAP NIL 2019    PAP LSIL 2019     Colonoscopy:  2016, Result: Normal, Next Colonoscopy: 1 year.  Dexa:  N/a    Health maintenance updated:  yes    HISTORY:  OB History    Para Term  AB Living   3 3 3 0 0 3   SAB TAB Ectopic Multiple Live Births   0 0 0 0 0      # Outcome Date GA Lbr Cj/2nd Weight Sex Delivery Anes PTL Lv   3 Term            2 Term            1 Term                Patient Active Problem List   Diagnosis     CARDIOVASCULAR SCREENING; LDL GOAL LESS THAN 130     Migraine headache     GERD (gastroesophageal reflux disease)     Phlebitis     MEDIAL MENISCUS TEAR - left     Colitis     Familial hyperlipoproteinemia     Paresthesias, hands  & feet     Shoulder impingement - bilateral     Cervical radiculopathy     Vertiginous migraine, acephalgic migraine episodes also     History of hysterectomy including cervix     Past Surgical History:   Procedure Laterality Date     AS ABLATION, ENDOMETRIAL, THERMAL, W/O HYSTEROSCOPIC GUIDANCE  2005    Her Option Endo Ablation     CL AFF SURGICAL PATHOLOGY       COLONOSCOPY WITH CO2 INSUFFLATION N/A 7/26/2016    Procedure: COLONOSCOPY WITH CO2 INSUFFLATION;  Surgeon: Ana Romo MD;  Location: [facility]     COMBINED ESOPHAGOSCOPY, GASTROSCOPY, DUODENOSCOPY (EGD) WITH CO2 INSUFFLATION N/A 7/26/2016    Procedure: COMBINED ESOPHAGOSCOPY, GASTROSCOPY, DUODENOSCOPY (EGD) WITH CO2 INSUFFLATION;  Surgeon: Ana Romo MD;  Location: [facility]     ENDOSCOPIC LIGATION VEIN(S)  1995    Right     ESOPHAGOSCOPY, GASTROSCOPY, DUODENOSCOPY (EGD), COMBINED N/A 7/26/2016    Procedure: COMBINED ESOPHAGOSCOPY, GASTROSCOPY, DUODENOSCOPY (EGD), BIOPSY SINGLE OR MULTIPLE;  Surgeon: Ana Romo MD;  Location: [facility] OR     HC DILATION/CURETTAGE DIAG/THER NON OB  2002     HC KNEE SCOPE,SINGLE MENISECTOMY  7/15/11    Left - LATERAL AND MEDIAL MENISECTOMIES - NOT SINGLE!!!     HC REMOVAL OF TONSILS,12+ Y/O  30 years ago     HYSTERECTOMY VAGINAL  04/11/2006    cervix removed     LASER CO2 VAGINA  2012    at [facility]     NECK SURGERY  2014    cervical fusion     TUBAL LIGATION  1993      Social History     Tobacco Use     Smoking status: Former Smoker     Packs/day: 0.50     Years: 3.00     Pack years: 1.50     Types: Cigarettes     Smokeless tobacco: Never Used   Substance Use Topics     Alcohol use: Yes     Alcohol/week: 2.0 - 3.0 standard drinks     Types: 2 - 3 Standard drinks or equivalent per week     Frequency: 2-4 times a month     Binge frequency: Never     Comment: 2-4 drinks/week      Problem (# of Occurrences) Relation (Name,Age of Onset)    Alcohol/Drug (1) Maternal Grandmother     "Allergies (1) Daughter    Arthritis (2) Mother, Father    Asthma (2) Son (Santana), Daughter    Cancer (1) Maternal Grandmother: Bone cancer    Cerebrovascular Disease (2) Father, Paternal Grandfather    Gastrointestinal Disease (1) Mother: Kidney    Heart Disease (2) Paternal Grandmother, Son (Santana)    Hypertension (1) Mother    Lipids (1) Father    Neurologic Disorder (3) Paternal Grandmother: migraines, Daughter: migraines, Son (Skyler): migraines       Negative family history of: Blood Disease, Anesthesia Reaction            Current Outpatient Medications   Medication Sig     aspirin 81 MG tablet Take 1 tablet by mouth daily.     Cholecalciferol (VITAMIN D3 PO) Take by mouth daily     estradiol (ESTRACE) 1 MG tablet Take 1 tablet (1 mg) by mouth daily     omeprazole (PRILOSEC) 20 MG capsule Take 1 capsule by mouth daily.     simvastatin (ZOCOR) 40 MG tablet Take 1 tablet (40 mg) by mouth At Bedtime     zolpidem (AMBIEN) 5 MG tablet TAKE 1 TABLET BY MOUTH AT NIGHT AS NEEDED     Cyclobenzaprine HCl (FLEXERIL PO)      meloxicam (MOBIC) 15 MG tablet TAKE ONE TABLET BY MOUTH DAILY AS NEEDED     No current facility-administered medications for this visit.      Allergies   Allergen Reactions     Ciprofloxacin      Verapamil Unknown       Past medical, surgical, social and family histories were reviewed and updated in EPIC.    ROS:   12 point review of systems negative other than symptoms noted below or in the HPI.  No urinary frequency or dysuria, bladder or kidney problems    EXAM:  /80   Pulse 82   Ht 1.613 m (5' 3.5\")   Wt 59.1 kg (130 lb 3.2 oz)   Breastfeeding No   BMI 22.70 kg/m     BMI: Body mass index is 22.7 kg/m .    PHYSICAL EXAM:  Constitutional:   Appearance: Well nourished, well developed, alert, in no acute distress  Neck:  Lymph Nodes:  No lymphadenopathy present    Thyroid:  Gland size normal, nontender, no nodules or masses present  on palpation  Chest:  Respiratory Effort:  Breathing " unlabored  Cardiovascular:    Heart: Auscultation:  Regular rate, normal rhythm, no murmurs present  Breasts: Inspection of Breasts:  No lymphadenopathy present., Palpation of Breasts and Axillae:  No masses present on palpation, no breast tenderness., Axillary Lymph Nodes:  No lymphadenopathy present. and No nodularity, asymmetry or nipple discharge bilaterally.  Gastrointestinal:   Abdominal Examination:  Abdomen nontender to palpation, tone normal without rigidity or guarding, no masses present, umbilicus without lesions   Liver and Spleen:  No hepatomegaly present, liver nontender to palpation    Hernias:  No hernias present  Lymphatic: Lymph Nodes:  No other lymphadenopathy present  Skin:  General Inspection:  No rashes present, no lesions present, no areas of  discoloration  Neurologic:    Mental Status:  Oriented X3.  Normal strength and tone, sensory exam                grossly normal, mentation intact and speech normal.    Psychiatric:   Mentation appears normal and affect normal/bright.         Pelvic Exam:  External Genitalia:     Normal appearance for age, no discharge present, no tenderness present, no inflammatory lesions present, color normal  Vagina:     Normal vaginal vault without central or paravaginal defects, no discharge present, no inflammatory lesions present, no masses present  Bladder:     Nontender to palpation  Urethra:   Urethral Body:  Urethra palpation normal, urethra structural support normal   Urethral Meatus:  No erythema or lesions present  Cervix:     Surgically absent  Uterus:     Surgically absent  Adnexa:     No adnexal tenderness present, no adnexal masses present  Perineum:     Perineum within normal limits, no evidence of trauma, no rashes or skin lesions present  Anus:     Anus within normal limits, no hemorrhoids present  Inguinal Lymph Nodes:     No lymphadenopathy present  Pubic Hair:     Normal pubic hair distribution for age  Genitalia and Groin:     No rashes present,  no lesions present, no areas of discoloration, no masses present      COUNSELING:   Special attention given to:        Regular exercise       Healthy diet/nutrition       Osteoporosis prevention/bone health       Colon cancer screening       (Lu)menopause management    BMI: Body mass index is 22.7 kg/m .      ASSESSMENT:  55 year old female with satisfactory annual exam.    ICD-10-CM    1. Encounter for gynecological examination without abnormal finding  Z01.419 Pap imaged thin layer screen with HPV - recommended age 30 - 65     HPV High Risk Types DNA Cervical   2. Symptomatic menopausal or female climacteric states  N95.1 estradiol (ESTRACE) 1 MG tablet   3. Hyperlipidemia LDL goal <100  E78.5 simvastatin (ZOCOR) 40 MG tablet     Lipid panel reflex to direct LDL Fasting   4. Primary insomnia  F51.01 zolpidem (AMBIEN) 5 MG tablet   5. Screening for metabolic disorder  Z13.228 Comprehensive metabolic panel       PLAN:  Normal gyn exam. Pap collected-vaginal sample. Ok to continue HRT. meds refilled. Labs today.     MIGUEL Macias CNP

## 2020-11-25 LAB
ALBUMIN SERPL-MCNC: 3.7 G/DL (ref 3.4–5)
ALP SERPL-CCNC: 70 U/L (ref 40–150)
ALT SERPL W P-5'-P-CCNC: 19 U/L (ref 0–50)
ANION GAP SERPL CALCULATED.3IONS-SCNC: 6 MMOL/L (ref 3–14)
AST SERPL W P-5'-P-CCNC: 22 U/L (ref 0–45)
BILIRUB SERPL-MCNC: 0.4 MG/DL (ref 0.2–1.3)
BUN SERPL-MCNC: 10 MG/DL (ref 7–30)
CALCIUM SERPL-MCNC: 9 MG/DL (ref 8.5–10.1)
CHLORIDE SERPL-SCNC: 105 MMOL/L (ref 94–109)
CHOLEST SERPL-MCNC: 254 MG/DL
CO2 SERPL-SCNC: 27 MMOL/L (ref 20–32)
CREAT SERPL-MCNC: 0.88 MG/DL (ref 0.52–1.04)
GFR SERPL CREATININE-BSD FRML MDRD: 74 ML/MIN/{1.73_M2}
GLUCOSE SERPL-MCNC: 71 MG/DL (ref 70–99)
HDLC SERPL-MCNC: 67 MG/DL
LDLC SERPL CALC-MCNC: 165 MG/DL
NONHDLC SERPL-MCNC: 187 MG/DL
POTASSIUM SERPL-SCNC: 4.1 MMOL/L (ref 3.4–5.3)
PROT SERPL-MCNC: 6.9 G/DL (ref 6.8–8.8)
SODIUM SERPL-SCNC: 138 MMOL/L (ref 133–144)
TRIGL SERPL-MCNC: 109 MG/DL

## 2020-11-25 ASSESSMENT — ANXIETY QUESTIONNAIRES: GAD7 TOTAL SCORE: 0

## 2020-11-27 LAB
COPATH REPORT: NORMAL
PAP: NORMAL

## 2020-12-02 ENCOUNTER — PATIENT OUTREACH (OUTPATIENT)
Dept: OBGYN | Facility: CLINIC | Age: 55
End: 2020-12-02

## 2020-12-02 NOTE — TELEPHONE ENCOUNTER
3/6/06 ECC- CARLITO 1.  Pt scheduled for hysterectomy  4/11/06 hysterectomy including cervix.  Cervix pathology- CARLITO 1  6/10, 8/11 NIL vaginal paps  10/17/12 LSIL. Plan: vaginal colpo  10/24/12 Colpo.  Vaginal bx- CARLITO 2-3. HSIL. Plan: laser vaporization  11/21/12 Colpo and vaginal laser vaporization  2/28/13 HSIL.  Plan: vaginal colposcopy  3/5/13 ASC-H.  Colpo-no lesion.  Plan: colpo q 3 months  10/29/13 NIL vaginal pap  4/30/14 ASCUS/+ HR HPV (not 16/18)  10/22/14 ASCUS/+ HR HPV (not 16/18)  4/7/15 NIL vaginal pap with inflammation  11/4/15 NIL vaginal pap  11/10/16 LSIL/+ HR HPV (not 16/18). Plan: vaginal colpo  11/30/16 Colpo.  Vaginal bx- MIKAELA 1.  3/27/17 ASCUS/+ HR HPV (not 16/18). Plan: repeat pap in 6 months.    9/13/17 LSIL/+ HR HPV (not 16/18) vaginal pap. Plan: vaginal colposcopy due by 12/13/17  10/9/17 vaginal colpo-no bx. No lesions seen . LSIL /+ HR HPV (not 16/18). Plain.  Repeat pap in 6 months.   12/28/17 ASCUS vaginal pap, + HR HPV (not 16/18). Plan pap in 6 months per provider  7/11/18 NIL vaginal pap, Neg HPV.  Plan: repeat pap in December 2018. If normal, can go to annual pap  1/10/19 LSIL vaginal pap, Neg HPV.  Plan:  Pap in 3 months  3/20/19 LSIL vaginal pap, Neg HPV. Plan: vaginal colpo   4/29/19 Vaginal colpo: visually normal.  Plan: pap in 6 months  10/30/19 NIL vaginal pap, Neg HPV.  Plan: pap in 1 year  11/24/20 NIL vaginal pap, neg HPV. Plan: pap in 1 year

## 2020-12-09 NOTE — PROGRESS NOTES
3/6/06 ECC- CARLITO 1.  Pt scheduled for hysterectomy  4/11/06 hysterectomy including cervix.  Cervix pathology- CARLITO 1  6/10, 8/11 NIL vaginal paps  10/17/12 LSIL. Plan: vaginal colpo  10/24/12 Colpo.  Vaginal bx- CARLITO 2-3. HSIL. Plan: laser vaporization  11/21/12 Colpo and vaginal laser vaporization  2/28/13 HSIL.  Plan: vaginal colposcopy  3/5/13 ASC-H.  Colpo-no lesion.  Plan: colpo q 3 months  10/29/13 NIL vaginal pap  4/30/14 ASCUS/+ HR HPV (not 16/18)  10/22/14 ASCUS/+ HR HPV (not 16/18)  4/7/15 NIL vaginal pap with inflammation  11/4/15 NIL vaginal pap  11/10/16 LSIL/+ HR HPV (not 16/18). Plan: vaginal colpo  11/30/16 Colpo.  Vaginal bx- MIKAELA 1.  3/27/17 ASCUS/+ HR HPV (not 16/18). Plan: repeat pap in 6 months.    9/13/17 LSIL/+ HR HPV (not 16/18) vaginal pap. Plan: vaginal colposcopy due by 12/13/17 9/21/17 advise of result and follow up  10/9/17 vaginal colpo-no bx. No lesions seen . LSIL /+ HR HPV (not 16/18)HPV. Plain.  Repeat pap in 6 months.   12/28/17 ASCUS vag pap, + HR HPV (not 16/18). Plan pap in 6 months per provider (mario)  01/05/18 Result letter sent at request of RN. ([facility])   06/15/18 Pap reminder letter sent. ([facility])  7/11/18 NIL vaginal pap, Neg HPV.  Plan: repeat pap in December 2018.  If normal, can return to annual paps (\[facility]\"")  12/14/18 Cotest reminder letter sent. ([facility])  1/10/19 LSIL vaginal pap, Neg HPV.  Plan:  Pap in 3 months  3/20/19 LSIL vaginal pap, Neg HPV. Plan: vaginal colpo  3/28/19 left detailed msg and mychart (\[facility]\"")  4/29/19 Vaginal colpo: visually normal.  Plan: pap in 6 months  10/14/19 Cotest reminder letter sent. ([facility])  10/30/19 NIL vaginal pap, Neg HPV.  Plan: pap in 1 year (william)    
2010

## 2021-03-06 DIAGNOSIS — F51.01 PRIMARY INSOMNIA: ICD-10-CM

## 2021-03-08 NOTE — TELEPHONE ENCOUNTER
Requested Prescriptions   Pending Prescriptions Disp Refills     zolpidem (AMBIEN) 5 MG tablet [Pharmacy Med Name: Zolpidem Tartrate Oral Tablet 5 MG] 30 tablet 0     Sig: TAKE ONE TABLET BY MOUTH AT BEDTIME AS NEEDED       There is no refill protocol information for this order

## 2021-03-09 RX ORDER — ZOLPIDEM TARTRATE 5 MG/1
TABLET ORAL
Qty: 30 TABLET | Refills: 0 | Status: SHIPPED | OUTPATIENT
Start: 2021-03-09 | End: 2021-04-26

## 2021-07-06 DIAGNOSIS — F51.01 PRIMARY INSOMNIA: ICD-10-CM

## 2021-07-06 NOTE — TELEPHONE ENCOUNTER
Requested Prescriptions   Pending Prescriptions Disp Refills     zolpidem (AMBIEN) 5 MG tablet [Pharmacy Med Name: Zolpidem Tartrate Oral Tablet 5 MG] 30 tablet 0     Sig: TAKE ONE TABLET BY MOUTH AT BEDTIME       There is no refill protocol information for this order        Last Written Prescription Date:  6/1/21  Last Fill Quantity: 30,  # refills: 0   Last office visit: 11/24/2020 with prescribing provider:  Lauren Bro   Future Office Visit:  None

## 2021-07-07 RX ORDER — ZOLPIDEM TARTRATE 5 MG/1
TABLET ORAL
Qty: 30 TABLET | Refills: 0 | Status: SHIPPED | OUTPATIENT
Start: 2021-07-07 | End: 2021-08-04

## 2021-08-04 DIAGNOSIS — F51.01 PRIMARY INSOMNIA: ICD-10-CM

## 2021-08-04 DIAGNOSIS — E78.5 HYPERLIPIDEMIA LDL GOAL <100: ICD-10-CM

## 2021-08-04 RX ORDER — SIMVASTATIN 40 MG
TABLET ORAL
Qty: 90 TABLET | Refills: 0 | Status: SHIPPED | OUTPATIENT
Start: 2021-08-04

## 2021-08-04 RX ORDER — ZOLPIDEM TARTRATE 5 MG/1
TABLET ORAL
Qty: 30 TABLET | Refills: 0 | Status: SHIPPED | OUTPATIENT
Start: 2021-08-04 | End: 2021-09-07

## 2021-08-04 NOTE — TELEPHONE ENCOUNTER
"Requested Prescriptions   Signed Prescriptions Disp Refills    simvastatin (ZOCOR) 40 MG tablet 90 tablet 0     Sig: TAKE ONE TABLET BY MOUTH AT BEDTIME       [facility] Protocol Passed - 8/4/2021 10:08 AM        Passed - LDL on file in past 12 months     Recent Labs   Lab Test 11/24/20  0944   *             Passed - No abnormal creatine kinase in past 12 months     No lab results found.             Passed - Recent (12 mo) or future (30 days) visit within the authorizing provider's specialty     Patient has had an office visit with the authorizing provider or a provider within the authorizing providers department within the previous 12 mos or has a future within next 30 days. See \"Patient Info\" tab in inbasket, or \"Choose Columns\" in Meds & Orders section of the refill encounter.              Passed - Medication is active on med list        Passed - Patient is age 18 or older        Passed - No active pregnancy on record        Passed - No positive pregnancy test in past 12 months               Prescription approved per [facility] Refill Protocol.    Natali Lowe RN    "

## 2021-08-04 NOTE — TELEPHONE ENCOUNTER
Requested Prescriptions   Pending Prescriptions Disp Refills     zolpidem (AMBIEN) 5 MG tablet [Pharmacy Med Name: Zolpidem Tartrate Oral Tablet 5 MG] 30 tablet 0     Sig: TAKE ONE TABLET BY MOUTH AT BEDTIME.       There is no refill protocol information for this order        Last Written Prescription Date:  7/7/21  Last Fill Quantity: 30,  # refills: 0   Last office visit: 11/24/2020 with prescribing provider:  Lauren Bro   Future Office Visit:  None       Routing refill request to provider for review/approval because:  Drug not on the [facility] refill protocol       Natali Lowe RN

## 2021-09-06 DIAGNOSIS — F51.01 PRIMARY INSOMNIA: ICD-10-CM

## 2021-09-07 RX ORDER — ZOLPIDEM TARTRATE 5 MG/1
TABLET ORAL
Qty: 30 TABLET | Refills: 0 | Status: SHIPPED | OUTPATIENT
Start: 2021-09-07 | End: 2021-10-13

## 2021-09-07 NOTE — TELEPHONE ENCOUNTER
Requested Prescriptions   Pending Prescriptions Disp Refills     zolpidem (AMBIEN) 5 MG tablet [Pharmacy Med Name: Zolpidem Tartrate Oral Tablet 5 MG] 30 tablet 0     Sig: TAKE ONE TABLET BY MOUTH AT BEDTIME.       There is no refill protocol information for this order

## 2021-09-19 ENCOUNTER — HEALTH MAINTENANCE LETTER (OUTPATIENT)
Age: 56
End: 2021-09-19

## 2021-10-13 DIAGNOSIS — F51.01 PRIMARY INSOMNIA: ICD-10-CM

## 2021-10-13 RX ORDER — ZOLPIDEM TARTRATE 5 MG/1
TABLET ORAL
Qty: 30 TABLET | Refills: 0 | Status: SHIPPED | OUTPATIENT
Start: 2021-10-13 | End: 2021-11-08

## 2021-10-13 NOTE — TELEPHONE ENCOUNTER
Requested Prescriptions   Pending Prescriptions Disp Refills     zolpidem (AMBIEN) 5 MG tablet [Pharmacy Med Name: Zolpidem Tartrate Oral Tablet 5 MG] 30 tablet 0     Sig: TAKE ONE TABLET BY MOUTH AT BEDTIME.       There is no refill protocol information for this order        Last Written Prescription Date:  9/7/21  Last Fill Quantity: 30,  # refills: 0   Last office visit: 11/24/2020 with prescribing provider:  Dieudonne   Future Office Visit:  NONE    Routing refill request to provider for review/approval because:  Drug not on the [facility] to the [facility] refill protocol       Mary Monterroso RN on 10/13/2021 at 9:48 AM

## 2021-11-10 ENCOUNTER — PATIENT OUTREACH (OUTPATIENT)
Dept: OBGYN | Facility: CLINIC | Age: 56
End: 2021-11-10
Payer: COMMERCIAL

## 2021-11-10 NOTE — LETTER
November 10, 2021      Katherine Tipton  [address]  [place] [address]        Dear ,    This letter is to remind you that you are due for your follow-up Pap smear and Human Papillomavirus (HPV) test.    Please call [phone] to schedule your appointment at your earliest convenience.    If you have completed the appointment outside of the [facility] system, please have the records forwarded to our office. We will update your chart for your provider to review before your next annual wellness visit.     Thank you for choosing [facility]!      Sincerely,    Your [facility] Care Team

## 2021-11-30 DIAGNOSIS — N95.1 SYMPTOMATIC MENOPAUSAL OR FEMALE CLIMACTERIC STATES: ICD-10-CM

## 2021-11-30 RX ORDER — ESTRADIOL 1 MG/1
1 TABLET ORAL DAILY
Qty: 30 TABLET | Refills: 0 | Status: SHIPPED | OUTPATIENT
Start: 2021-11-30 | End: 2024-05-09

## 2021-11-30 NOTE — TELEPHONE ENCOUNTER
"Requested Prescriptions   Pending Prescriptions Disp Refills     estradiol (ESTRACE) 1 MG tablet [Pharmacy Med Name: Estradiol Oral Tablet 1 MG] 90 tablet 0     Sig: TAKE ONE TABLET BY MOUTH ONE TIME DAILY       Hormone Replacement Therapy Failed - 11/30/2021 10:02 AM        Failed - Blood pressure under 140/90 in past 12 months     BP Readings from Last 3 Encounters:   11/24/20 132/80   10/30/19 118/80   04/29/19 118/74                 Failed - Recent (12 mo) or future (30 days) visit within the authorizing provider's specialty     Patient has had an office visit with the authorizing provider or a provider within the authorizing providers department within the previous 12 mos or has a future within next 30 days. See \"Patient Info\" tab in inbasket, or \"Choose Columns\" in Meds & Orders section of the refill encounter.              Failed - Patient has mammogram in past 2 years on file if age 50-75        Passed - Medication is active on med list        Passed - Patient is 18 years of age or older        Passed - No active pregnancy on record        Passed - No positive pregnancy test on record in past 12 months           Last Written Prescription Date:  11/24/20  Last Fill Quantity: 90,  # refills: 3   Last office visit: 11/24/2020 with prescribing provider:  Dieudonne   Future Office Visit:  NONE    Medication is being filled for 1 time refill only due to:  Patient needs to be seen because it has been more than one year since last visit.  Mary Monterroso RN on 11/30/2021 at 10:06 AM          "

## 2021-12-09 DIAGNOSIS — F51.01 PRIMARY INSOMNIA: ICD-10-CM

## 2021-12-09 RX ORDER — ZOLPIDEM TARTRATE 5 MG/1
TABLET ORAL
Qty: 30 TABLET | Refills: 0 | OUTPATIENT
Start: 2021-12-09

## 2021-12-09 NOTE — TELEPHONE ENCOUNTER
Requested Prescriptions   Pending Prescriptions Disp Refills     zolpidem (AMBIEN) 5 MG tablet [Pharmacy Med Name: Zolpidem Tartrate Oral Tablet 5 MG] 30 tablet 0     Sig: TAKE ONE TABLET BY MOUTH AT BEDTIME       There is no refill protocol information for this order        Last Written Prescription Date:  11/8/21  Last Fill Quantity: 30,  # refills: 0   Last office visit: 11/24/2020 with prescribing provider:  MASSIMO Bro NP   Future Office Visit:      Overdue for annual exam. Refused.  Lauren Rolon RN on 12/9/2021 at 9:42 AM

## 2022-01-03 DIAGNOSIS — N95.1 SYMPTOMATIC MENOPAUSAL OR FEMALE CLIMACTERIC STATES: ICD-10-CM

## 2022-01-04 RX ORDER — ESTRADIOL 1 MG/1
1 TABLET ORAL DAILY
Qty: 30 TABLET | Refills: 0 | OUTPATIENT
Start: 2022-01-04

## 2022-01-04 NOTE — TELEPHONE ENCOUNTER
"Requested Prescriptions   Pending Prescriptions Disp Refills     estradiol (ESTRACE) 1 MG tablet [Pharmacy Med Name: Estradiol Oral Tablet 1 MG] 30 tablet 0     Sig: Take 1 tablet (1 mg) by mouth daily. Appointment needed for additional refills.       Hormone Replacement Therapy Failed - 1/3/2022  6:55 PM        Failed - Blood pressure under 140/90 in past 12 months     BP Readings from Last 3 Encounters:   11/24/20 132/80   10/30/19 118/80   04/29/19 118/74                 Failed - Recent (12 mo) or future (30 days) visit within the authorizing provider's specialty     Patient has had an office visit with the authorizing provider or a provider within the authorizing providers department within the previous 12 mos or has a future within next 30 days. See \"Patient Info\" tab in inbasket, or \"Choose Columns\" in Meds & Orders section of the refill encounter.              Failed - Patient has mammogram in past 2 years on file if age 50-75        Passed - Medication is active on med list        Passed - Patient is 18 years of age or older        Passed - No active pregnancy on record        Passed - No positive pregnancy test on record in past 12 months           Last Written Prescription Date:  11/30/21  Last Fill Quantity: 30,  # refills: 0   Last office visit: 11/24/2020 with prescribing provider:  Dieudonne   Future Office Visit:  NONE    Pt due for annual, no appt scheduled. Pt already received one month extension. Rx denied.   Mary Monterroso RN on 1/4/2022 at 5:52 AM          "

## 2022-01-07 NOTE — TELEPHONE ENCOUNTER
FYI to provider - Patient is lost to pap tracking follow-up. Attempts to contact pt have been made per reminder process and there has been no reply and/or no appt scheduled.       3/6/06 ECC- CARLITO 1.  Pt scheduled for hysterectomy  4/11/06 hysterectomy including cervix.  Cervix pathology- CARLITO 1  6/10, 8/11 NIL vaginal paps  10/17/12 LSIL. Plan: vaginal colpo  10/24/12 Colpo.  Vaginal bx- CARLITO 2-3. HSIL. Plan: laser vaporization  11/21/12 Colpo and vaginal laser vaporization  2/28/13 HSIL.  Plan: vaginal colposcopy  3/5/13 ASC-H.  Colpo-no lesion.  Plan: colpo q 3 months  10/29/13 NIL vaginal pap  4/30/14 ASCUS/+ HR HPV (not 16/18)  10/22/14 ASCUS/+ HR HPV (not 16/18)  4/7/15 NIL vaginal pap with inflammation  11/4/15 NIL vaginal pap  11/10/16 LSIL/+ HR HPV (not 16/18). Plan: vaginal colpo  11/30/16 Colpo.  Vaginal bx- MIKAELA 1.  3/27/17 ASCUS/+ HR HPV (not 16/18). Plan: repeat pap in 6 months.    9/13/17 LSIL/+ HR HPV (not 16/18) vaginal pap. Plan: vaginal colposcopy due by 12/13/17  10/9/17 vaginal colpo-no bx. No lesions seen . LSIL /+ HR HPV (not 16/18). Plain.  Repeat pap in 6 months.   12/28/17 ASCUS vaginal pap, + HR HPV (not 16/18). Plan pap in 6 months per provider  7/11/18 NIL vaginal pap, Neg HPV.  Plan: repeat pap in December 2018. If normal, can go to annual pap  1/10/19 LSIL vaginal pap, Neg HPV.  Plan:  Pap in 3 months  3/20/19 LSIL vaginal pap, Neg HPV. Plan: vaginal colpo   4/29/19 Vaginal colpo: visually normal.  Plan: pap in 6 months  10/30/19 NIL vaginal pap, Neg HPV.  Plan: pap in 1 year  11/24/20 NIL vaginal pap, neg HPV. Plan: pap in 1 year  11/10/21 Reminder letter  12/10/21 Reminder call - spoke to pt  1/7/22 Lost to follow-up for pap tracking

## 2022-01-09 ENCOUNTER — HEALTH MAINTENANCE LETTER (OUTPATIENT)
Age: 57
End: 2022-01-09

## 2022-11-20 ENCOUNTER — HEALTH MAINTENANCE LETTER (OUTPATIENT)
Age: 57
End: 2022-11-20

## 2023-04-15 ENCOUNTER — HEALTH MAINTENANCE LETTER (OUTPATIENT)
Age: 58
End: 2023-04-15

## 2023-09-11 ENCOUNTER — TRANSCRIBE ORDERS (OUTPATIENT)
Dept: OTHER | Age: 58
End: 2023-09-11

## 2023-09-11 DIAGNOSIS — E16.8 HYPOINSULINISM: Primary | ICD-10-CM

## 2023-09-16 ENCOUNTER — HEALTH MAINTENANCE LETTER (OUTPATIENT)
Age: 58
End: 2023-09-16

## 2024-04-24 NOTE — CONFIDENTIAL NOTE
RECORDS RECEIVED FROM: internal /ce   DATE RECEIVED: 4.30.24    NOTES (FOR ALL VISITS) STATUS DETAILS   OFFICE NOTES from referring provider internal    Ofelia Poole NP      OFFICE NOTES from other specialist ce [facility]- 9.6.23 7.20.23 Virgilio      MEDICATION LIST internal       CT (HEAD/NECK/CHEST/ABDOMEN) Ce  [facility]- 12.1.23, 12.19.23  PET- 1.26.23    [facility]- 1/8/24     LABS     DIABETES: HBGA1C, CREATININE, FASTING LIPIDS, MICROALBUMIN URINE, POTASSIUM, TSH, T4    THYROID: TSH, T4, CBC, THYRODLONULIN, TOTAL T3, FREE T4, CALCITONIN, CEA internal /ce [facility]- 11.6.23  Glucose - 7.25.23

## 2024-04-30 ENCOUNTER — PRE VISIT (OUTPATIENT)
Dept: ENDOCRINOLOGY | Facility: CLINIC | Age: 59
End: 2024-04-30

## 2024-04-30 ENCOUNTER — TELEPHONE (OUTPATIENT)
Dept: ENDOCRINOLOGY | Facility: CLINIC | Age: 59
End: 2024-04-30

## 2024-04-30 NOTE — TELEPHONE ENCOUNTER
Patient needs to be rescheduled for their virtual visit due to Reason for Reschedule: Patient Request    Appointment mode: Video  Provider: Evan Malin    Pt was called for appt on 4/30 and pt was unaware that she had an appt and is unable to make it.  Pts communication preferences were updated in order for pt to receive the correct reminders for upcoming appts.

## 2024-05-02 ENCOUNTER — TELEPHONE (OUTPATIENT)
Dept: ENDOCRINOLOGY | Facility: CLINIC | Age: 59
End: 2024-05-02
Payer: MEDICARE

## 2024-05-02 NOTE — TELEPHONE ENCOUNTER
Left Voicemail (1st Attempt) for the patient to call back and schedule the following:    Appointment type: New Diabetes   Provider: Any that see Hypoinsulinism   Return date: next avail   Specialty phone number: [phone]  Additional appointment(s) needed: NA   Additonal Notes: LVM , MyC x1  Patient needs to be rescheduled for their virtual visit due to Reason for Reschedule: Patient Request     Appointment mode: Video  Provider: Evan Malin     Pt was called for appt on 4/30 and pt was unaware that she had an appt and is unable to make it.  Pts communication preferences were updated in order for pt to receive the correct reminders for upcoming appts.     Vidya Gregg on 5/2/2024 at 4:36 PM

## 2024-05-06 NOTE — TELEPHONE ENCOUNTER
Left Voicemail (2nd Attempt) for the patient to call back and schedule the following:     Appointment type: New Diabetes   Provider: Any that see Hypoinsulinism   Return date: next avail   Specialty phone number: [phone]  Additional appointment(s) needed: NA   Additonal Notes: LVM , MyC x2 (final attempt)   Patient needs to be rescheduled for their virtual visit due to Reason for Reschedule: Patient Request     Appointment mode: Video  Provider: Evan Malin     Pt was called for appt on 4/30 and pt was unaware that she had an appt and is unable to make it.  Pts communication preferences were updated in order for pt to receive the correct reminders for upcoming appts.

## 2024-05-09 ENCOUNTER — VIRTUAL VISIT (OUTPATIENT)
Dept: ENDOCRINOLOGY | Facility: CLINIC | Age: 59
End: 2024-05-09
Attending: NURSE PRACTITIONER
Payer: MEDICARE

## 2024-05-09 DIAGNOSIS — E16.8 HYPOINSULINISM: ICD-10-CM

## 2024-05-09 DIAGNOSIS — E16.2 LOW BLOOD GLUCOSE MEASUREMENT: Primary | ICD-10-CM

## 2024-05-09 PROCEDURE — G2211 COMPLEX E/M VISIT ADD ON: HCPCS | Mod: 95 | Performed by: STUDENT IN AN ORGANIZED HEALTH CARE EDUCATION/TRAINING PROGRAM

## 2024-05-09 PROCEDURE — 99205 OFFICE O/P NEW HI 60 MIN: CPT | Mod: 95 | Performed by: STUDENT IN AN ORGANIZED HEALTH CARE EDUCATION/TRAINING PROGRAM

## 2024-05-09 RX ORDER — DULOXETIN HYDROCHLORIDE 60 MG/1
120 CAPSULE, DELAYED RELEASE ORAL
COMMUNITY
Start: 2023-04-19

## 2024-05-09 NOTE — NURSING NOTE
Is the patient currently in the state of [place]? YES    Visit mode:VIDEO    If the visit is dropped, the patient can be reconnected by: VIDEO VISIT: Text to cell phone:   Telephone Information:   Mobile [phone]       Will anyone else be joining the visit? NO  (If patient encounters technical issues they should call [phone])    How would you like to obtain your AVS? MyChart    Are changes needed to the allergy or medication list? Yes pt has medications requested for removal  PT also takes   Folic acid   Probiotitc   Tumeric  Super b complex    Are refills needed on medications prescribed by this physician? NO-new pt     Reason for visit: Consult    Lupis Morris VVF      Pt is doing a trial with a nerve stimulator   Pt has 3 neck fuses- pt has arthritis all over.

## 2024-05-09 NOTE — PATIENT INSTRUCTIONS
It was nice meeting.    Please schedule an 8am blood draw    Check Blood sugar at time of symptoms and write down what you ate that day.

## 2024-05-09 NOTE — PROGRESS NOTES
Endocrinology Clinic Visit 5/9/2024      Video-Visit Details    Type of service:  Video Visit    Joined the call at 5/9/2024, 11:36:48 am.  Left the call at 5/9/2024, 12:14:07 pm.    Originating Location (pt. Location): Home        Distant Location (provider location):  Off-site    Mode of Communication:  Video Conference via [employer]    Physician has received verbal consent for a Video Visit from the patient? Yes    I spent a total of 60 minutes on the date of encounter reviewing medical records, evaluating the patient, coordinating care and documenting in the EHR, as detailed above.      NAME:  Katherine Tipton  PCP:  Dani Hwang  MRN:  [id number]  Reason for Consult:  concern for low BG  Requesting Provider:  Ofelia Poole    Chief Complaint     Chief Complaint   Patient presents with    Consult       History of Present Illness     Katherine Tipton is a 59 year old female who is seen in video visit for low blood glucose.    She has a PMH significant for RA, chronic pain, migraine and GERD.    She reported she was having sx of low bg back in her 20s, shaking and fainting. She said her bg at doctors apt were low in 60-70. At time of fainting , BG was checked by EMS and she said it was 40.  The frequency of those episodes decreased with time. She said she adapted her lifestyle to it, also stress decreased which helped. It happens now once a month.    Sx during the episodes: shaky, confused, nauseous, feels about to faint. She sit down and drinks orange juice or eat nuts. Feels better within 15 min.    It happens mostly mid afternoon , before lunch , 4 hours after breakfast. Never nocturnal.    Stress could induce it. Or when physically active. Not related to any specific food.    Last fall she was checking her BG at time of her sx, lowest is 50. Last episode was 3 weeks ago.    She probably had OGTT in the past, 30 YEARS ago. She does not remember the details.     She reported stable wt.    Post  menopausal age of 50. Was on estrogen stopped 2 years ago.    [facility]  Most recent TSH 1/4/24 was 1.22  7/25/2023 insulin low at 2 ( 2.6-24.9), c-peptide 7/2023 was 1.3. glucose 84.  No evidence of low BG, lowest 80.      Family hx: stoke in father. DM2 in mother.    Social: she is on disability. She does not smoke. She does not drink alcohol. No illicit drugs or recreational.      Problem List     Patient Active Problem List   Diagnosis    CARDIOVASCULAR SCREENING; LDL GOAL LESS THAN 130    Migraine headache    GERD (gastroesophageal reflux disease)    Phlebitis    MEDIAL MENISCUS TEAR - left    Colitis    Familial hyperlipoproteinemia    Paresthesias, hands & feet    Shoulder impingement - bilateral    Cervical radiculopathy    Vertiginous migraine, acephalgic migraine episodes also    History of hysterectomy including cervix        Medications     Current Outpatient Medications   Medication Sig Dispense Refill    DULoxetine (CYMBALTA) 60 MG capsule Take 120 mg by mouth      meloxicam (MOBIC) 15 MG tablet TAKE ONE TABLET BY MOUTH DAILY AS NEEDED      omeprazole (PRILOSEC) 20 MG capsule Take 1 capsule by mouth daily. 30 capsule 4    simvastatin (ZOCOR) 40 MG tablet TAKE ONE TABLET BY MOUTH AT BEDTIME  90 tablet 0    tiZANidine (ZANAFLEX) 4 MG tablet three times a day.       No current facility-administered medications for this visit.        Allergies     Allergies   Allergen Reactions    Ciprofloxacin     Verapamil Unknown       Medical / Surgical History     Past Medical History:   Diagnosis Date    Abnormal Pap smear of cervix     ASCUS with positive high risk human papillomavirus of vagina 12/28/2017    not 16/18    Diverticulitis     Familial hyperlipoproteinemia     GERD (gastroesophageal reflux disease)     Headache(784.0)     History of hysterectomy including cervix 4/11/2006    3/6/06 ECC- CARLITO 1.  Pt scheduled for hysterectomy 4/11/06 hysterectomy including cervix.  Cervix  pathology- CARLITO 1 6/10, 8/11 NIL vaginal paps 10/17/12 [facility]IL. Plan: vaginal colpo 10/24/12 Colpo.  Vaginal bx- CARLITO 2-3. HSIL. Plan: laser vaporization 11/21/12 Colpo and vaginal laser vaporization 2/28/13 HSIL.  Plan: vaginal colposcopy 3/5/13 [facility].  Colpo-no lesion.  Plan: colpo q 3 months 10/29/13 NIL    Migraine     Phlebitis     h/o    Seasonal allergies     VAIN (vaginal intraepithelial neoplasia)     Varicose vein     Both legs. Surgery right leg     Past Surgical History:   Procedure Laterality Date    AS ABLATION, ENDOMETRIAL, THERMAL, W/O HYSTEROSCOPIC GUIDANCE  2005    [facility] Ablation    [facility] SURGICAL PATHOLOGY      COLONOSCOPY WITH CO2 INSUFFLATION N/A 7/26/2016    Procedure: COLONOSCOPY WITH CO2 INSUFFLATION;  Surgeon: Ana Romo MD;  Location: [facility]    COMBINED ESOPHAGOSCOPY, GASTROSCOPY, DUODENOSCOPY (EGD) WITH CO2 INSUFFLATION N/A 7/26/2016    Procedure: COMBINED ESOPHAGOSCOPY, GASTROSCOPY, DUODENOSCOPY (EGD) WITH CO2 INSUFFLATION;  Surgeon: Ana Romo MD;  Location: [facility]    ENDOSCOPIC LIGATION VEIN(S)  1995    Right    ESOPHAGOSCOPY, GASTROSCOPY, DUODENOSCOPY (EGD), COMBINED N/A 7/26/2016    Procedure: COMBINED ESOPHAGOSCOPY, GASTROSCOPY, DUODENOSCOPY (EGD), BIOPSY SINGLE OR MULTIPLE;  Surgeon: Ana Romo MD;  Location: [facility] OR    HC DILATION/CURETTAGE DIAG/THER NON OB  2002    HC KNEE SCOPE,SINGLE MENISECTOMY  7/15/11    Left - LATERAL AND MEDIAL MENISECTOMIES - NOT SINGLE!!!    HC REMOVAL OF TONSILS,12+ Y/O  30 years ago    HYSTERECTOMY VAGINAL  04/11/2006    cervix removed    IR LUMBAR PUNCTURE  10/12/2021    IR LUMBAR PUNCTURE  4/1/2022    LASER CO2 VAGINA  2012    at [facility]    NECK SURGERY  2014    cervical fusion    TUBAL LIGATION  1993       Social History     Social History     Socioeconomic History    Marital status:      Spouse name: Not on file    Number of children: 3    Years of education: 14    Highest  education level: Not on file   Occupational History    Occupation: Research and Development     Employer: [employer]   Tobacco Use    Smoking status: Former     Current packs/day: 0.50     Average packs/day: 0.5 packs/day for 3.0 years (1.5 ttl pk-yrs)     Types: Cigarettes    Smokeless tobacco: Never   Substance and Sexual Activity    Alcohol use: Yes     Alcohol/week: 2.0 - 3.0 standard drinks of alcohol     Types: 2 - 3 Standard drinks or equivalent per week     Comment: 2-4 drinks/week    Drug use: No    Sexual activity: Yes     Partners: Male     Birth control/protection: Female Surgical     Comment: hysterectomy-no cervix   Other Topics Concern    Parent/sibling w/ CABG, MI or angioplasty before 65F 55M? Not Asked   Social History Narrative    Not on file     Social Determinants of Health     Financial Resource Strain: Not on file   Food Insecurity: Not on file   Transportation Needs: Not on file   Physical Activity: Not on file   Stress: Not on file   Social Connections: Not on file   Interpersonal Safety: Not on file   Housing Stability: Not on file       Family History     Family History   Problem Relation Age of Onset    Arthritis Mother     Gastrointestinal Disease Mother         Kidney    Hypertension Mother     Cerebrovascular Disease Father     Lipids Father     Arthritis Father     Cancer Maternal Grandmother         Bone cancer    Alcohol/Drug Maternal Grandmother     Heart Disease Paternal Grandmother     Neurologic Disorder Paternal Grandmother         migraines    Cerebrovascular Disease Paternal Grandfather     Heart Disease Son     Asthma Son     Asthma Daughter     Allergies Daughter     Neurologic Disorder Daughter         migraines    Neurologic Disorder Son         migraines    Blood Disease No family hx of     Anesthesia Reaction No family hx of        ROS     12 ROS completed, pertinent positive and negative in HPI    Physical Exam   There were no vitals taken for this visit.   GENERAL: alert  "and no distress  EYES: Eyes grossly normal to inspection.  No discharge or erythema, or obvious scleral/conjunctival abnormalities.  RESP: No audible wheeze, cough, or visible cyanosis.    SKIN: Visible skin clear. No significant rash, abnormal pigmentation or lesions.  NEURO: Cranial nerves grossly intact.  Mentation and speech appropriate for age.  PSYCH: Appropriate affect, tone, and pace of words     Labs/Imaging     Pertinent Labs were reviewed and updated in EPIC and discussed briefly.  Radiology Results were  reviewed and updated in EPIC and discussed briefly.    Summary of recent findings:   No results found for: \"A1C\"    TSH   Date Value Ref Range Status   06/08/2011 0.93 0.4 - 5.0 mU/L Final       Creatinine   Date Value Ref Range Status   11/24/2020 0.88 0.52 - 1.04 mg/dL Final       Recent Labs   Lab Test 11/24/20  0944 01/23/19  0813   CHOL 254* 169   HDL 67 70   * 85   TRIG 109 71       No results found for: \"[id number]\", \"[id number]\", \"[id number]\"    I personally reviewed the patient's outside records from [facility] EMR and Care Everywhere. Summary of pertinent findings in HPI.    Impression / Plan     1. Concern for hypoglycemia  Whipple triad not met. Very low suspicion for hyperinsulinemia. Episodes are infrequent, diagnostic CGM wont be helpful. We discussed checking BG at time of episodes with glucometer, if needed we could in the future consider personal cgm. We discussed having a diary of food on the day of the episodes. Could consider getting blood draw if BG POC below 50 , checking serum c-peptide , insulin and betahydroxybutyrate if someone is able to get her to the lab prior to correcting her low bg.  We will check am cortisol to r/o AI.      Test and/or medications prescribed today:  Orders Placed This Encounter   Procedures    Cortisol         Follow up: 3 month  The longitudinal plan of care for the diagnosis(es)/condition(s) as documented were addressed during this visit. Due to the " added complexity in care, I will continue to support Katherine in the subsequent management and with ongoing continuity of care.      Evan Boogie MD  Endocrinology, Diabetes and Metabolism  [facility]

## 2024-05-24 ENCOUNTER — TELEPHONE (OUTPATIENT)
Dept: ENDOCRINOLOGY | Facility: CLINIC | Age: 59
End: 2024-05-24
Payer: MEDICARE

## 2024-05-24 NOTE — TELEPHONE ENCOUNTER
Left Voicemail (1st Attempt) and Sent Mychart (1st Attempt) for the patient to call back and schedule the following:    Appointment type: return endocrine   Provider: Chuyita   Return date: around Aug 2024   Specialty phone number: [phone]  Additional appointment(s) needed:  Additonal Notes:   Ok to use maryse to schedule f/up     Available:   9/23, 9/24     Soonest available appts     Please note that the above appointment(s) will require manual scheduling as they are marked as MARYSE and will not appear using auto search. Do not schedule the patient if another patient has already been scheduled in the requested appointment slot.       Alise Mg on 5/24/2024 at 4:32 PM

## 2024-05-29 ENCOUNTER — TELEPHONE (OUTPATIENT)
Dept: ENDOCRINOLOGY | Facility: CLINIC | Age: 59
End: 2024-05-29
Payer: MEDICARE

## 2024-05-29 DIAGNOSIS — E16.2 LOW BLOOD GLUCOSE MEASUREMENT: ICD-10-CM

## 2024-05-29 NOTE — TELEPHONE ENCOUNTER
[facility] Call Center    Phone Message    May a detailed message be left on voicemail: yes     Reason for Call: Medication Question or concern regarding medication   Prescription Clarification  Name of Medication: blood glucose (NO BRAND SPECIFIED) test strip [34613]  Prescribing Provider: Chuyita   Pharmacy: [id number] - [place], [place] - [address] [place] [address].   What on the order needs clarification? Please re-send Rx to pharmacy with under  legal surname of Kory.        Action Taken: Other: endo    Travel Screening: Not Applicable

## 2024-05-29 NOTE — TELEPHONE ENCOUNTER
Left Voicemail (2nd Attempt) for the patient to call back and schedule the following:     Appointment type: return endocrine   Provider: Chuyita   Return date: around Aug 2024   Specialty phone number: [phone]  Additional appointment(s) needed:  Additonal Notes:   Ok to use maryse to schedule f/up      Available:   9/23, 9/24      Soonest available appts      Please note that the above appointment(s) will require manual scheduling as they are marked as MARYSE and will not appear using auto search. Do not schedule the patient if another patient has already been scheduled in the requested appointment slot.     Alise Mg on 5/29/2024 at 1:07 PM

## 2024-05-30 NOTE — TELEPHONE ENCOUNTER
"  blood glucose (NO BRAND SPECIFIED) test strip [[id number]]     Last Written Prescription Date:  5/9/24  Last Fill Quantity: 90,   # refills: 3     [id number], [place] [address]     Remaining refills sent as requested to pharmacy.\"   legal surname of Kory.  \"      Prescribing Provider: Alajarvis              Pharmacy: [facility] [id number] [place] [address], [place] [address]  MyChart notification sent               "

## 2024-06-05 DIAGNOSIS — E16.2 HYPOGLYCEMIA: ICD-10-CM

## 2024-06-05 DIAGNOSIS — E16.8 HYPOINSULINISM: Primary | ICD-10-CM

## 2024-06-05 RX ORDER — LANCETS
EACH MISCELLANEOUS
Qty: 100 EACH | Refills: 1 | Status: SHIPPED | OUTPATIENT
Start: 2024-06-05 | End: 2024-06-12

## 2024-06-06 ENCOUNTER — LAB (OUTPATIENT)
Dept: LAB | Facility: OTHER | Age: 59
End: 2024-06-06
Payer: MEDICARE

## 2024-06-06 DIAGNOSIS — E16.2 LOW BLOOD GLUCOSE MEASUREMENT: ICD-10-CM

## 2024-06-06 PROCEDURE — 82533 TOTAL CORTISOL: CPT

## 2024-06-06 PROCEDURE — 36415 COLL VENOUS BLD VENIPUNCTURE: CPT

## 2024-06-07 ENCOUNTER — MYC MEDICAL ADVICE (OUTPATIENT)
Dept: ENDOCRINOLOGY | Facility: CLINIC | Age: 59
End: 2024-06-07
Payer: MEDICARE

## 2024-06-07 LAB — CORTIS SERPL-MCNC: 8.5 UG/DL

## 2024-06-11 ENCOUNTER — MYC MEDICAL ADVICE (OUTPATIENT)
Dept: ENDOCRINOLOGY | Facility: CLINIC | Age: 59
End: 2024-06-11
Payer: MEDICARE

## 2024-06-11 DIAGNOSIS — E16.2 HYPOGLYCEMIA: ICD-10-CM

## 2024-06-11 DIAGNOSIS — E16.8 HYPOINSULINISM: ICD-10-CM

## 2024-06-11 DIAGNOSIS — R79.89 LOW SERUM CORTISOL LEVEL: Primary | ICD-10-CM

## 2024-06-11 RX ORDER — ALBUTEROL SULFATE 90 UG/1
1-2 AEROSOL, METERED RESPIRATORY (INHALATION)
Status: CANCELLED
Start: 2024-06-18

## 2024-06-11 RX ORDER — DIPHENHYDRAMINE HYDROCHLORIDE 50 MG/ML
50 INJECTION INTRAMUSCULAR; INTRAVENOUS
Status: CANCELLED
Start: 2024-06-18

## 2024-06-11 RX ORDER — MEPERIDINE HYDROCHLORIDE 25 MG/ML
25 INJECTION INTRAMUSCULAR; INTRAVENOUS; SUBCUTANEOUS EVERY 30 MIN PRN
Status: CANCELLED | OUTPATIENT
Start: 2024-06-18

## 2024-06-11 RX ORDER — EPINEPHRINE 1 MG/ML
0.3 INJECTION, SOLUTION, CONCENTRATE INTRAVENOUS EVERY 5 MIN PRN
Status: CANCELLED | OUTPATIENT
Start: 2024-06-18

## 2024-06-11 RX ORDER — COSYNTROPIN 0.25 MG/ML
250 INJECTION, POWDER, FOR SOLUTION INTRAMUSCULAR; INTRAVENOUS ONCE
Status: CANCELLED
Start: 2024-06-18 | End: 2024-06-18

## 2024-06-11 RX ORDER — ALBUTEROL SULFATE 0.83 MG/ML
2.5 SOLUTION RESPIRATORY (INHALATION)
Status: CANCELLED | OUTPATIENT
Start: 2024-06-18

## 2024-06-12 ENCOUNTER — TELEPHONE (OUTPATIENT)
Dept: NURSING | Facility: CLINIC | Age: 59
End: 2024-06-12

## 2024-06-12 ENCOUNTER — TELEPHONE (OUTPATIENT)
Dept: ENDOCRINOLOGY | Facility: CLINIC | Age: 59
End: 2024-06-12
Payer: MEDICARE

## 2024-06-12 RX ORDER — LANCETS
EACH MISCELLANEOUS
Qty: 100 EACH | Refills: 1 | Status: SHIPPED | OUTPATIENT
Start: 2024-06-12

## 2024-06-12 NOTE — TELEPHONE ENCOUNTER
[facility] Call Center    Phone Message    May a detailed message be left on voicemail: yes     Reason for Call: Other: pharmacy has faxed over a form 3 times that needs to be filled out for Medicare. There's 14 questions on it and it needs a signature by Dr. Boogie. Please fill and fax back to pharmacy.

## 2024-06-14 ENCOUNTER — MEDICAL CORRESPONDENCE (OUTPATIENT)
Dept: HEALTH INFORMATION MANAGEMENT | Facility: CLINIC | Age: 59
End: 2024-06-14

## 2024-07-30 ENCOUNTER — INFUSION THERAPY VISIT (OUTPATIENT)
Dept: INFUSION THERAPY | Facility: CLINIC | Age: 59
End: 2024-07-30
Attending: STUDENT IN AN ORGANIZED HEALTH CARE EDUCATION/TRAINING PROGRAM
Payer: MEDICARE

## 2024-07-30 VITALS
BODY MASS INDEX: 23.24 KG/M2 | DIASTOLIC BLOOD PRESSURE: 77 MMHG | OXYGEN SATURATION: 97 % | SYSTOLIC BLOOD PRESSURE: 121 MMHG | RESPIRATION RATE: 18 BRPM | WEIGHT: 133.3 LBS | TEMPERATURE: 98.6 F | HEART RATE: 75 BPM

## 2024-07-30 DIAGNOSIS — R79.89 LOW SERUM CORTISOL LEVEL: Primary | ICD-10-CM

## 2024-07-30 LAB
ANION GAP SERPL CALCULATED.3IONS-SCNC: 10 MMOL/L (ref 7–15)
CHLORIDE SERPL-SCNC: 107 MMOL/L (ref 98–107)
CORTICOSTER 1H P 250 UG ACTH SERPL-SCNC: 28.1 UG/DL
CORTICOSTER 30M P 250 UG ACTH SERPL-SCNC: 26.2 UG/DL
CORTICOSTER SERPL-MCNC: 10.7 UG/DL (ref 4–22)
HCO3 SERPL-SCNC: 25 MMOL/L (ref 22–29)
HOLD SPECIMEN: NORMAL
POTASSIUM SERPL-SCNC: 4.5 MMOL/L (ref 3.4–5.3)
SODIUM SERPL-SCNC: 142 MMOL/L (ref 135–145)
TIME OF COSYNTROPIN INJECTION: NORMAL

## 2024-07-30 PROCEDURE — 82533 TOTAL CORTISOL: CPT | Performed by: STUDENT IN AN ORGANIZED HEALTH CARE EDUCATION/TRAINING PROGRAM

## 2024-07-30 PROCEDURE — 99207 PR NO CHARGE LOS: CPT

## 2024-07-30 PROCEDURE — 36415 COLL VENOUS BLD VENIPUNCTURE: CPT | Performed by: STUDENT IN AN ORGANIZED HEALTH CARE EDUCATION/TRAINING PROGRAM

## 2024-07-30 PROCEDURE — 250N000011 HC RX IP 250 OP 636: Performed by: STUDENT IN AN ORGANIZED HEALTH CARE EDUCATION/TRAINING PROGRAM

## 2024-07-30 PROCEDURE — 84244 ASSAY OF RENIN: CPT | Performed by: STUDENT IN AN ORGANIZED HEALTH CARE EDUCATION/TRAINING PROGRAM

## 2024-07-30 PROCEDURE — 82374 ASSAY BLOOD CARBON DIOXIDE: CPT | Performed by: STUDENT IN AN ORGANIZED HEALTH CARE EDUCATION/TRAINING PROGRAM

## 2024-07-30 PROCEDURE — 82024 ASSAY OF ACTH: CPT | Performed by: STUDENT IN AN ORGANIZED HEALTH CARE EDUCATION/TRAINING PROGRAM

## 2024-07-30 PROCEDURE — 96374 THER/PROPH/DIAG INJ IV PUSH: CPT

## 2024-07-30 RX ORDER — ALBUTEROL SULFATE 90 UG/1
1-2 AEROSOL, METERED RESPIRATORY (INHALATION)
Start: 2024-07-30

## 2024-07-30 RX ORDER — EPINEPHRINE 1 MG/ML
0.3 INJECTION, SOLUTION INTRAMUSCULAR; SUBCUTANEOUS EVERY 5 MIN PRN
OUTPATIENT
Start: 2024-07-30

## 2024-07-30 RX ORDER — ALBUTEROL SULFATE 0.83 MG/ML
2.5 SOLUTION RESPIRATORY (INHALATION)
OUTPATIENT
Start: 2024-07-30

## 2024-07-30 RX ORDER — METHYLPREDNISOLONE SODIUM SUCCINATE 125 MG/2ML
125 INJECTION, POWDER, LYOPHILIZED, FOR SOLUTION INTRAMUSCULAR; INTRAVENOUS
Start: 2024-07-30

## 2024-07-30 RX ORDER — MEPERIDINE HYDROCHLORIDE 25 MG/ML
25 INJECTION INTRAMUSCULAR; INTRAVENOUS; SUBCUTANEOUS EVERY 30 MIN PRN
OUTPATIENT
Start: 2024-07-30

## 2024-07-30 RX ORDER — COSYNTROPIN 0.25 MG/ML
250 INJECTION, POWDER, FOR SOLUTION INTRAMUSCULAR; INTRAVENOUS ONCE
Status: CANCELLED
Start: 2024-07-30 | End: 2024-07-30

## 2024-07-30 RX ORDER — METHOTREXATE 2.5 MG/1
TABLET ORAL
COMMUNITY
Start: 2024-07-15

## 2024-07-30 RX ORDER — TRAMADOL HYDROCHLORIDE 50 MG/1
50 TABLET ORAL EVERY 6 HOURS
COMMUNITY
Start: 2022-11-22

## 2024-07-30 RX ORDER — COSYNTROPIN 0.25 MG/ML
250 INJECTION, POWDER, FOR SOLUTION INTRAMUSCULAR; INTRAVENOUS ONCE
Status: COMPLETED | OUTPATIENT
Start: 2024-07-30 | End: 2024-07-30

## 2024-07-30 RX ORDER — DIPHENHYDRAMINE HYDROCHLORIDE 50 MG/ML
50 INJECTION INTRAMUSCULAR; INTRAVENOUS
Start: 2024-07-30

## 2024-07-30 RX ADMIN — COSYNTROPIN 250 MCG: 0.25 INJECTION, POWDER, LYOPHILIZED, FOR SOLUTION INTRAMUSCULAR; INTRAVENOUS at 09:05

## 2024-07-30 NOTE — PROGRESS NOTES
Infusion Nursing Note:  Katherine Luevanonoemy presents today for ACTH Stimulation Test.    Patient seen by provider today: No   present during visit today: Not Applicable.    Note: Patient new to [place] infusion, oriented to infusion room. Written education given to patient and discussed what to expect. Patient expressed no questions prior to start.     Patient's baseline labs drawn off PIV- hemolyzed, lab came to draw patient's baseline labs as well as time 30 and 60 labs as well.       Intravenous Access:  Peripheral IV placed.    Treatment Conditions:  N/A.      Post Infusion Assessment:  Patient tolerated infusion without incident.  Blood return noted pre and post infusion.  Site patent and intact, free from redness, edema or discomfort.  No evidence of extravasations.  Access discontinued per protocol.       Discharge Plan:   AVS to patient via MYCHART.  Patient will return as needed for next appointment.   Patient discharged in stable condition accompanied by: self.  Departure Mode: Ambulatory.      Chantell Jennings RN

## 2024-07-31 LAB — ACTH PLAS-MCNC: 14 PG/ML

## 2024-08-01 LAB — RENIN PLAS-CCNC: 1.2 NG/ML/HR

## 2024-11-04 ENCOUNTER — LAB (OUTPATIENT)
Dept: LAB | Facility: CLINIC | Age: 59
End: 2024-11-04
Payer: MEDICARE

## 2024-11-04 DIAGNOSIS — R23.2 FLUSHING: ICD-10-CM

## 2024-11-04 DIAGNOSIS — T78.2XXD IDIOPATHIC ANAPHYLACTIC REACTION, SUBSEQUENT ENCOUNTER: ICD-10-CM

## 2024-11-04 DIAGNOSIS — L50.1 IDIOPATHIC URTICARIA: Primary | ICD-10-CM

## 2024-11-04 DIAGNOSIS — T78.3XXD ANGIONEUROTIC EDEMA, SUBSEQUENT ENCOUNTER: ICD-10-CM

## 2024-11-04 LAB
ALT SERPL W P-5'-P-CCNC: 20 U/L (ref 0–50)
AST SERPL W P-5'-P-CCNC: 25 U/L (ref 0–45)
BILIRUB SERPL-MCNC: 0.3 MG/DL
CREAT SERPL-MCNC: 0.84 MG/DL (ref 0.51–0.95)
EGFRCR SERPLBLD CKD-EPI 2021: 80 ML/MIN/1.73M2
ERYTHROCYTE [DISTWIDTH] IN BLOOD BY AUTOMATED COUNT: 13.6 % (ref 10–15)
HCT VFR BLD AUTO: 39.5 % (ref 35–47)
HGB BLD-MCNC: 13.2 G/DL (ref 11.7–15.7)
Lab: NORMAL
MCH RBC QN AUTO: 34.2 PG (ref 26.5–33)
MCHC RBC AUTO-ENTMCNC: 33.4 G/DL (ref 31.5–36.5)
MCV RBC AUTO: 102 FL (ref 78–100)
PERFORMING LABORATORY: NORMAL
PLATELET # BLD AUTO: 257 10E3/UL (ref 150–450)
RBC # BLD AUTO: 3.86 10E6/UL (ref 3.8–5.2)
SPECIMEN STATUS: NORMAL
TEST NAME: NORMAL
WBC # BLD AUTO: 7.9 10E3/UL (ref 4–11)

## 2024-11-05 LAB
C4 SERPL-MCNC: 27 MG/DL (ref 13–39)
IGG SERPL-MCNC: 607 MG/DL (ref 610–1616)
IGM SERPL-MCNC: 133 MG/DL (ref 35–242)
TRYPTASE SERPL-MCNC: 3.3 UG/L

## 2024-11-07 LAB
CH50 SERPL-ACNC: 79.5 U/ML
G6PD RBC-CCNT: 15.7 U/G HB

## 2024-11-09 ENCOUNTER — HEALTH MAINTENANCE LETTER (OUTPATIENT)
Age: 59
End: 2024-11-09

## 2024-11-11 LAB — C1Q SERPL-MCNC: 99 UG/ML

## 2024-11-13 LAB — C2 SERPL-MCNC: 2.1 MG/DL

## 2024-11-14 LAB — MAYO MISC RESULT: NORMAL

## 2025-09-04 ENCOUNTER — TRANSCRIBE ORDERS (OUTPATIENT)
Dept: OTHER | Age: 60
End: 2025-09-04

## 2025-09-04 DIAGNOSIS — K82.8 BILIARY DYSKINESIA: Primary | ICD-10-CM
